# Patient Record
Sex: FEMALE | Race: BLACK OR AFRICAN AMERICAN | Employment: FULL TIME | URBAN - METROPOLITAN AREA
[De-identification: names, ages, dates, MRNs, and addresses within clinical notes are randomized per-mention and may not be internally consistent; named-entity substitution may affect disease eponyms.]

---

## 2017-01-04 ENCOUNTER — HOSPITAL ENCOUNTER (OUTPATIENT)
Dept: PHYSICAL THERAPY | Age: 47
Discharge: HOME | End: 2017-01-04
Attending: FAMILY MEDICINE
Payer: COMMERCIAL

## 2017-01-04 DIAGNOSIS — M79.7 FIBROMYALGIA: ICD-10-CM

## 2017-01-04 PROCEDURE — 97163 PT EVAL HIGH COMPLEX 45 MIN: CPT

## 2017-01-04 NOTE — PROGRESS NOTES
Ambulatory/Rehab Services H2 Model Falls Risk Assessment    Risk Factor Pts. ·   Confusion/Disorientation/Impulsivity  []    4 ·   Symptomatic Depression  []   2 ·   Altered Elimination  []   1 ·   Dizziness/Vertigo  []   1 ·   Gender (Male)  []   1 ·   Any administered antiepileptics (anticonvulsants):  []   2 ·   Any administered benzodiazepines:  []   1 ·   Visual Impairment (specify):  []   1 ·   Portable Oxygen Use  []   1 ·   Orthostatic ? BP  []   1 ·   History of Recent Falls (within 3 mos.)  []   5     Ability to Rise from Chair (choose one) Pts. ·   Ability to rise in a single movement  []   0 ·   Pushes up, successful in one attempt  []   1 ·   Multiple attempts, but successful  []   3 ·   Unable to rise without assistance  [x]   4   Total: (5 or greater = High Risk) 4     Falls Prevention Plan:   []                Physical Limitations to Exercise (specify):   []                Mobility Assistance Device (type):   []                Exercise/Equipment Adaptation (specify):    ©2010 [facility] [address] [place] [address] Patent #[id number].  Federal Law prohibits the replication, distribution or use without written permission from [facility] of [address] [place] [address]  1/4/2017

## 2017-01-04 NOTE — PROGRESS NOTES
Edwin Benito  : 1970 [facility] [place] [address] [address] [facility]  [phone]   [phone]        OUTPATIENT PHYSICAL THERAPY:Initial Assessment, Treatment Day: Day of Assessment and AM 2017    ICD-10: Treatment Diagnosis: fibromyalgia (M79.7)  Difficulty in walking, not elsewhere classified (R26.2)  Low back pain (M54.5)  Precautions/Allergies:   Percocet [oxycodone-acetaminophen] and Morphine   Fall Risk Score: 4 (? 5 = High Risk)  MD Orders: Evaluate and treat MEDICAL/REFERRING DIAGNOSIS:  Fibromyalgia [M79.7]   DATE OF ONSET: 18, diagnosed in , symptoms progressively getting worse  REFERRING PHYSICIAN: [address]: none scheduled at this time   Edwin Benito has attended 1 physical therapy session including initial evaluation. INITIAL ASSESSMENT: Assessed 17  Ms. Norm Jackson presents with increased pain and fatigue with diagnosis of fibromyalgia. She has a diagnosis of herniated disc lumbar and cervical spine. She presents with severe self reported fibromyalgia symptoms, severe self reported physical fatigue, moderate self reported overall fatigue (physical, cognitive, and psychosocial), and self reported mild mood disturbance for Plascencia Depression Index. She presents with postural and gait deviations listed in detail below. She presents with increased risk of falling according to Timed Up and Go assessment. She presents with decreased independence with functional mobility and requires assistance from her  for sit to stand transfers, tub transfers, bed mobility, and activities of daily living. She would benefit from skilled physical therapy in order to improve pain free independence with functional mobility, reduce fall risk and prevent future impairment. PROBLEM LIST (Impacting functional limitations):  1. Decreased Strength  2. Decreased ADL/Functional Activities  3.  Decreased Transfer Abilities  4. Decreased Ambulation Ability/Technique  5. Decreased Balance  6. Increased Pain  7. Decreased Activity Tolerance  8. Decreased Pacing Skills  9. Decreased Work Simplification/Energy Conservation Techniques  10. Increased Fatigue  11. Decreased Flexibility/Joint Mobility  12. Decreased [place] with Home Exercise Program INTERVENTIONS PLANNED:  1. Balance Exercise  2. Bed Mobility  3. Cold  4. Family Education  5. Gait Training  6. Heat  7. Home Exercise Program (HEP)  8. Manual Therapy  9. Neuromuscular Re-education/Strengthening  10. Range of Motion (ROM)  11. Therapeutic Activites  12. Therapeutic Exercise/Strengthening  13. Transfer Training  14. aquatic therapy   TREATMENT PLAN:  Effective Dates: 1/4/17 TO 4/4/17. Frequency/Duration: 2 times a week for 8 weeks  GOALS: (Goals have been discussed and agreed upon with patient.)  DISCHARGE GOALS: Time Frame: 8 weeks  Patient will be independent with home exercise program within 8 weeks in order to improve independence with management of patient's symptoms and/or functional deficits. Patient will improve their fibromyalgia impact scale score to less than 60 points within 8 weeks in order to improve activity tolerance and pain free independence with activities of daily living and functional mobility. Patient will improve their physical subscale score for modified fatigue impact scale to 28 points within 8 weeks in order to improve activity tolerance. Rehabilitation Potential For Stated Goals: Good  Regarding Zachary Babs Gan's therapy, I certify that the treatment plan above will be carried out by a therapist or under their direction. Thank you for this referral,  Ad Black PT     Referring Physician Signature: Mamie Aguillon *              Date                    HISTORY:   History of Present Injury/Illness (Reason for Referral):  Taranjose Geigerher notes she has been diagnosed with fibromyalgia since 1992.  She notes symptoms are progressively getting worse. She notes she is finding it harder to push through the symptoms. She requires assistance with functional transfers, bed mobility, and activities of daily living. She does work full time but notes frequent days she misses due to fibromyalgia symptoms. She notes she missed 7 days in December 2016. She reports pain in low back as nerve pain being pulled and tingling pain. She notes pain in posterior legs with straight leg raise and slump test this date. She declined to move low back during movement assessment due to fear of pain. 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 Job requirements include prolonged sitting, computer use, phone use  Tamiko Molina requires assistance from  to stand from any surface, bed mobility, get in/out of car, bathe (get in/out of tub), cook, clean, dress at times  Personal Factors:          Sex:  female        Age:  42 y.o. Current Medications: toprimate, trazadone, vitamin D2, diaxapam, flexeril, loritab, ambien   Date Last Reviewed:  1/4/2017   Number of Personal Factors/Comorbidities that affect the Plan of Care: 3+: HIGH COMPLEXITY   EXAMINATION:   Observation/Orthostatic Postural Assessment:  Assessed 1/4/17:  Tamiko Molina sits with forward head and rounded shoulders which indicate tight anterior chest musculature, upper trapezius, and levator scapula and weak posterior scapula musculature and deep cervical flexors.  She stands with left iliac crest superior to right, left lower extremity longer in supine (unable to assess in long sitting due to pain). She presents with convex curve to left in thoracic region, convex curve to right in lumbar spine. Right shoulder inferior to left,   Palpation:  Assessed 1/4/17        Santi Crowe is tender to palpate whole body throughout evaluation  ROM:  Assessed 1/4/17  Dorsiflexion to neutral with knee extended - limited by pain in posterior thighs and low back          Selective Functional Movement Assessment  Nonfunctional and painful  Cervical flexion, extension, rotation/side bend bilaterally 75% - pain mid back with all motions  appleys scratch test external rotation to T4 bilaterally - pain in forearms  appleys scratch test internal rotation to thoracolumbar region bilaterally - pain in shoulders  Multisegmental flexion and extension - decline to perform due to fear of increased pain  Multisegmental rotation bilaterally - 10% pain L5 region  Single leg stance less than 3 seconds bilaterally - performed independently  Strength:  Assessed 1/4/17        3+/5 all major muscle groups bilateral upper and lower extremities   Special Tests:  Assessed 1/4/17:        Slump test - positive bilaterally  Straight leg raise - positive bilaterally 20 degrees  Neurological Screen: Assessed 1/4/17         Myotomes:  Weak (limited by pain) but symmetrical bilateral lower and upper extremities         Dermatomes:  Tingling noted bilateral lower extremities, feet, hands        Reflexes:   Will assess as able  Functional Mobility: Assessed 1/4/17:        Gait/Ambulation:  Santi Crowe ambulates with modified independence, she ambulates with decreased step length bilaterally, decreased heel strike at initial contact, decreased hip extension/internal rotation during terminal stance        Transfers:  Santi Crowe required moderate assistance with sit to stand transfer this date, she placed hands on thighs to assist with standing upright        Bed Mobility:  Leila Bowman was able to perform bed mobility with modified independence -she used hands to assist lower extremities onto rehab mat - she notes she usually has moderate assistance with bed mobility when at home        Stairs:  Leila Bowman notes she requires moderate assistance with stair negotiation at home  Balance:  Assessed 1/4/17        Tool Used: Timed Up and Go (TUG)  Score:  Initial: 30 seconds    Interpretation of Score: The test measures, in seconds, the time taken by an individual to stand up from a standard arm chair (seat height 46 cm [18 in], arm height 65 cm [25.6 in]), walk a distance of 3 meters (118 in, approx 10 ft), turn, walk back to the chair and sit down. If the individual takes longer than 14 seconds to complete TUG, this indicates risk for falls. Score 7 7.5-10.5 11-14 14.5-17.5 18-21 21.5-24.5 25+   Modifier CH CI CJ CK CL CM CN     Mental Status:  Assessed 1/4/17        Alert and oriented x 4  Vitals: Will assess as able     Body Structures Involved:  1. Nerves  2. Bones  3. Joints  4. Muscles  5. Ligaments Body Functions Affected:  1. Sensory/Pain  2. Neuromusculoskeletal  3. Movement Related  4. Skin Related Activities and Participation Affected:  1. Learning and Applying Knowledge  2. General Tasks and Demands  3. Mobility  4. Self Care  5. Domestic Life  6. Interpersonal Interactions and Relationships  7.  Community, Social and [facility]   Number of elements that affect the Plan of Care: 4+: HIGH COMPLEXITY   CLINICAL PRESENTATION:   Presentation: Evolving clinical presentation with unstable and unpredictable characteristics: HIGH COMPLEXITY   CLINICAL DECISION MAKING:   Outcome Measure: Assessed 1/4/17    Tool Used: Fibromyalgia Impact Questionnaire  Score:  Initial: 72.73    Interpretation of Score: <40 = Mild Fibromyalgia Syndrome   40-60 = Moderate Fibromyalgia Syndrome   >60-70 = Severe Fibromyalgia Syndrome  Score 0 1-18 19-37 38-57 58-77 78-96 97 Modifier CH CI CJ CK CL CM CN     Tool Used: Plascencia Depression Index   Score:  Initial: 14    Interpretation of Score: 0-10 = ups and downs considered normal   11-16= mild mood disturbance   17-20= borderline clinical depression   21-30= moderate depression   31-40= severe depression   Over 40= extreme depression     Tool Used: Modified Fatigue Impact Scale  Score:  Initial: Total score: 42/84  Physical subscale score: 32  Cognitive subscale score: 3  Psychosocial subscale score: 7      Tool Used: 6-MINUTE WALK TEST  Score:  Initial: 400 feet - one standing rest    Interpretation of Score: Normal range varies but is approximately 9678-6000 Feet    Medical Necessity:   · Patient is expected to demonstrate progress in strength, range of motion, balance, coordination and functional technique to decrease assistance required with functional mobility and increase independence with pain free functional mobility. · Patient demonstrates good rehab potential due to higher previous functional level. Reason for Services/Other Comments:  · Patient continues to require skilled intervention due to increased pain and decreased independence with functional mobility. Use of outcome tool(s) and clinical judgement create a POC that gives a: Difficult prediction of patient's progress: HIGH COMPLEXITY   TREATMENT:   (In addition to Assessment/Re-Assessment sessions the following treatments were rendered)  Assessment only today, no treatment provided. Treatment/Session Assessment:  Tamiko Molina became tearful throughout therapy session due to increased pain and emotional stress from current symptoms. · Pre-treatment Symptoms:  Tamiko Molina notes 8/10 pain throughout body - mostly in back  · Pain: Initial:  Pain Intensity 1: 8  Pain Location 1: Back (whole body but mostly back)  Pain Orientation 1: Lower  Pain Intervention(s) 1: Position 8/10 Post Session:  8/10 ·   Compliance with Program/Exercises:  Will assess as treatment progresses. · Recommendations/Intent for next treatment session: \"Next visit will focus on advancements to more challenging activities and reduction in assistance provided\".   Total Treatment Duration:  PT Patient Time In/Time Out  Time In: 0925  Time Out: [address], [place]

## 2017-01-10 ENCOUNTER — HOSPITAL ENCOUNTER (OUTPATIENT)
Dept: PHYSICAL THERAPY | Age: 47
Discharge: HOME | End: 2017-01-10
Payer: COMMERCIAL

## 2017-01-10 PROCEDURE — 97113 AQUATIC THERAPY/EXERCISES: CPT

## 2017-01-11 NOTE — PROGRESS NOTES
Edwin Benito  : 1970 [facility] [place] [address] [address] [facility]  [phone]   [phone]        OUTPATIENT PHYSICAL THERAPY:Daily Note and Aquatic Note 1/10/2017    ICD-10: Treatment Diagnosis: fibromyalgia (M79.7)  Difficulty in walking, not elsewhere classified (R26.2)  Low back pain (M54.5)  Precautions/Allergies:   Percocet [oxycodone-acetaminophen] and Morphine   Fall Risk Score: 4 (? 5 = High Risk)  MD Orders: Evaluate and treat MEDICAL/REFERRING DIAGNOSIS:  Fibromyalgia   DATE OF ONSET: 18, diagnosed in , symptoms progressively getting worse  REFERRING PHYSICIAN: [address]: none scheduled at this time   Edwin Benito has attended 1 physical therapy session including initial evaluation. INITIAL ASSESSMENT: Assessed 17  Ms. Norm Jackson presents with increased pain and fatigue with diagnosis of fibromyalgia. She has a diagnosis of herniated disc lumbar and cervical spine. She presents with severe self reported fibromyalgia symptoms, severe self reported physical fatigue, moderate self reported overall fatigue (physical, cognitive, and psychosocial), and self reported mild mood disturbance for Plascencia Depression Index. She presents with postural and gait deviations listed in detail below. She presents with increased risk of falling according to Timed Up and Go assessment. She presents with decreased independence with functional mobility and requires assistance from her  for sit to stand transfers, tub transfers, bed mobility, and activities of daily living. She would benefit from skilled physical therapy in order to improve pain free independence with functional mobility, reduce fall risk and prevent future impairment. PROBLEM LIST (Impacting functional limitations):  1. Decreased Strength  2. Decreased ADL/Functional Activities  3. Decreased Transfer Abilities  4.  Decreased Ambulation Ability/Technique  5. Decreased Balance  6. Increased Pain  7. Decreased Activity Tolerance  8. Decreased Pacing Skills  9. Decreased Work Simplification/Energy Conservation Techniques  10. Increased Fatigue  11. Decreased Flexibility/Joint Mobility  12. Decreased Wake with Home Exercise Program INTERVENTIONS PLANNED:  1. Balance Exercise  2. Bed Mobility  3. Cold  4. Family Education  5. Gait Training  6. Heat  7. Home Exercise Program (HEP)  8. Manual Therapy  9. Neuromuscular Re-education/Strengthening  10. Range of Motion (ROM)  11. Therapeutic Activites  12. Therapeutic Exercise/Strengthening  13. Transfer Training  14. aquatic therapy   TREATMENT PLAN:  Effective Dates: 1/4/17 TO 4/4/17. Frequency/Duration: 2 times a week for 8 weeks  GOALS: (Goals have been discussed and agreed upon with patient.)  DISCHARGE GOALS: Time Frame: 8 weeks  Patient will be independent with home exercise program within 8 weeks in order to improve independence with management of patient's symptoms and/or functional deficits. Patient will improve their fibromyalgia impact scale score to less than 60 points within 8 weeks in order to improve activity tolerance and pain free independence with activities of daily living and functional mobility. Patient will improve their physical subscale score for modified fatigue impact scale to 28 points within 8 weeks in order to improve activity tolerance. Rehabilitation Potential For Stated Goals: Good  Regarding Amaya Gna's therapy, I certify that the treatment plan above will be carried out by a therapist or under their direction. Thank you for this referral,  Gabby Robles PTA     Referring Physician Signature: Angelica Diego *              Date                    HISTORY:   History of Present Injury/Illness (Reason for Referral):  Calvert Karbj notes she has been diagnosed with fibromyalgia since 1992.  She notes symptoms are progressively getting worse. She notes she is finding it harder to push through the symptoms. She requires assistance with functional transfers, bed mobility, and activities of daily living. She does work full time but notes frequent days she misses due to fibromyalgia symptoms. She notes she missed 7 days in December 2016. She reports pain in low back as nerve pain being pulled and tingling pain. She notes pain in posterior legs with straight leg raise and slump test this date. She declined to move low back during movement assessment due to fear of pain. 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 Job requirements include prolonged sitting, computer use, phone use  Radha Wells requires assistance from  to stand from any surface, bed mobility, get in/out of car, bathe (get in/out of tub), cook, clean, dress at times  Personal Factors:          Sex:  female        Age:  42 y.o. Current Medications: toprimate, trazadone, vitamin D2, diaxapam, flexeril, loritab, ambien   Date Last Reviewed:  1/10/2017   Number of Personal Factors/Comorbidities that affect the Plan of Care: 3+: HIGH COMPLEXITY   EXAMINATION:   Observation/Orthostatic Postural Assessment:  Assessed 1/4/17:  Radha Wells sits with forward head and rounded shoulders which indicate tight anterior chest musculature, upper trapezius, and levator scapula and weak posterior scapula musculature and deep cervical flexors.  She stands with left iliac crest superior to right, left lower extremity longer in supine (unable to assess in long sitting due to pain). She presents with convex curve to left in thoracic region, convex curve to right in lumbar spine. Right shoulder inferior to left,   Palpation:  Assessed 1/4/17        Katie He is tender to palpate whole body throughout evaluation  ROM:  Assessed 1/4/17  Dorsiflexion to neutral with knee extended - limited by pain in posterior thighs and low back          Selective Functional Movement Assessment  Nonfunctional and painful  Cervical flexion, extension, rotation/side bend bilaterally 75% - pain mid back with all motions  appleys scratch test external rotation to T4 bilaterally - pain in forearms  appleys scratch test internal rotation to thoracolumbar region bilaterally - pain in shoulders  Multisegmental flexion and extension - decline to perform due to fear of increased pain  Multisegmental rotation bilaterally - 10% pain L5 region  Single leg stance less than 3 seconds bilaterally - performed independently  Strength:  Assessed 1/4/17        3+/5 all major muscle groups bilateral upper and lower extremities   Special Tests:  Assessed 1/4/17:        Slump test - positive bilaterally  Straight leg raise - positive bilaterally 20 degrees  Neurological Screen: Assessed 1/4/17         Myotomes:  Weak (limited by pain) but symmetrical bilateral lower and upper extremities         Dermatomes:  Tingling noted bilateral lower extremities, feet, hands        Reflexes:   Will assess as able  Functional Mobility: Assessed 1/4/17:        Gait/Ambulation:  Katie He ambulates with modified independence, she ambulates with decreased step length bilaterally, decreased heel strike at initial contact, decreased hip extension/internal rotation during terminal stance        Transfers:  Katie He required moderate assistance with sit to stand transfer this date, she placed hands on thighs to assist with standing upright        Bed Mobility:  Katie He was able to perform bed mobility with modified independence -she used hands to assist lower extremities onto rehab mat - she notes she usually has moderate assistance with bed mobility when at home        Stairs:  Edwin Benito notes she requires moderate assistance with stair negotiation at home  Balance:  Assessed 1/4/17        Tool Used: Timed Up and Go (TUG)  Score:  Initial: 30 seconds    Interpretation of Score: The test measures, in seconds, the time taken by an individual to stand up from a standard arm chair (seat height 46 cm [18 in], arm height 65 cm [25.6 in]), walk a distance of 3 meters (118 in, approx 10 ft), turn, walk back to the chair and sit down. If the individual takes longer than 14 seconds to complete TUG, this indicates risk for falls. Score 7 7.5-10.5 11-14 14.5-17.5 18-21 21.5-24.5 25+   Modifier CH CI CJ CK CL CM CN     Mental Status:  Assessed 1/4/17        Alert and oriented x 4  Vitals: Will assess as able     Body Structures Involved:  1. Nerves  2. Bones  3. Joints  4. Muscles  5. Ligaments Body Functions Affected:  1. Sensory/Pain  2. Neuromusculoskeletal  3. Movement Related  4. Skin Related Activities and Participation Affected:  1. Learning and Applying Knowledge  2. General Tasks and Demands  3. Mobility  4. Self Care  5. Domestic Life  6. Interpersonal Interactions and Relationships  7.  Community, Social and [place]   Number of elements that affect the Plan of Care: 4+: HIGH COMPLEXITY   CLINICAL PRESENTATION:   Presentation: Evolving clinical presentation with unstable and unpredictable characteristics: HIGH COMPLEXITY   CLINICAL DECISION MAKING:   Outcome Measure: Assessed 1/4/17    Tool Used: Fibromyalgia Impact Questionnaire  Score:  Initial: 72.73    Interpretation of Score: <40 = Mild Fibromyalgia Syndrome   40-60 = Moderate Fibromyalgia Syndrome   >60-70 = Severe Fibromyalgia Syndrome  Score 0 1-18 19-37 38-57 58-77 78-96 97   Modifier CH CI CJ CK CL CM CN Tool Used: Plascencia Depression Index   Score:  Initial: 14    Interpretation of Score: 0-10 = ups and downs considered normal   11-16= mild mood disturbance   17-20= borderline clinical depression   21-30= moderate depression   31-40= severe depression   Over 40= extreme depression     Tool Used: Modified Fatigue Impact Scale  Score:  Initial: Total score: 42/84  Physical subscale score: 32  Cognitive subscale score: 3  Psychosocial subscale score: 7      Tool Used: 6-MINUTE WALK TEST  Score:  Initial: 400 feet - one standing rest    Interpretation of Score: Normal range varies but is approximately 6867-1422 Feet    Medical Necessity:   · Patient is expected to demonstrate progress in strength, range of motion, balance, coordination and functional technique to decrease assistance required with functional mobility and increase independence with pain free functional mobility. · Patient demonstrates good rehab potential due to higher previous functional level. Reason for Services/Other Comments:  · Patient continues to require skilled intervention due to increased pain and decreased independence with functional mobility. Use of outcome tool(s) and clinical judgement create a POC that gives a: Difficult prediction of patient's progress: HIGH COMPLEXITY   TREATMENT:   (In addition to Assessment/Re-Assessment sessions the following treatments were rendered)    Aquatic Therapy (45 minutes): Aquatic treatment performed per flow grid for Decreased muscle strength, Decreased endurance and Decreased static/dynamic balance and reactive control.        Aquatic Exercise Log       Date  1-10-17 Date   Date   Date   Date     Activity/ Exercise Parameters Parameters Parameters Parameters Parameters   Walking forward 2 laps       Walking backward 2 laps       Walking sideways 2 laps         Marching 2 laps         Goose Step 2 laps         Tip toes 2 laps         Heels          Lunges        Side step squats        LE Exercises noodle         Hip Flex/Ext Marching x 10 B         Hip Abd/Add X 10 B         Hip IR/ER          Calf raises          Knee Flex          Squats Knees to chest x 10          Leg Circles          Step Ups        UE Exercises noodle         Squeeze In X 10 B         Push Down X 10 B         Pull Down          Bicep/Tricep        Rows/Press outs         Chi Positions          Trunk Rotation        Deep H2O/ Noodles noodle         Stabilization          Arms only          Legs only Under arms- bicycle       Cross   Country 2 x 10          Scissors 2 x 10          Crab walk 2 laps       Lower abdominal   work  Knees to chest x 10          Cardio          Jogging        Lap   Swimming          Stretches []  In [place]  [x]  Whirlpool* []  In [place]  []  Whirlpool* []  In [place]  []  Whirlpool* []  In Pool  []  Whirlpool* []  In Pool  []  Whirlpool*     Hamstrings X 3 B         Heelcords X 3 B         Piriformis X 3 B         Neck                  * For increased soft tissue extensibility a warm water (over 100°F) environment was utilized. Supervision/monitoring was provided at all times. Treatment/Session Assessment:  Ruth Ann Lozano reports that her pain was less than when we started today. · Pre-treatment Symptoms:  Ruth Ann Lozano notes 7/10 pain throughout body - mostly in knees and back. · Pain: Initial:    10/10 Post Session:  7/10 ·   Compliance with Program/Exercises: Will assess as treatment progresses. · Recommendations/Intent for next treatment session: \"Next visit will focus on advancements to more challenging activities and reduction in assistance provided\".   Total Treatment Duration: 45 minutes   PT Patient Time In/Time Out  Time In: 1400 (Patient was 15 minutes late)  Time Out: [address] [facility], [facility]

## 2017-01-13 ENCOUNTER — HOSPITAL ENCOUNTER (OUTPATIENT)
Dept: PHYSICAL THERAPY | Age: 47
Discharge: HOME | End: 2017-01-13
Attending: FAMILY MEDICINE
Payer: COMMERCIAL

## 2017-01-13 NOTE — PROGRESS NOTES
Luz Garcia  : 1970 [facility] [address]  [phone]   [phone]        OUTPATIENT DAILY NOTE    NAME/AGE/GENDER: Luz Garcia is a 42 y.o. female. DATE: 2017    Patient canceled physical therapy appointment today due to recent injection. Will plan to follow up on next scheduled visit.     Senait Gaitan, PT

## 2017-01-17 ENCOUNTER — HOSPITAL ENCOUNTER (OUTPATIENT)
Dept: PHYSICAL THERAPY | Age: 47
Discharge: HOME | End: 2017-01-17
Payer: COMMERCIAL

## 2017-01-17 PROCEDURE — 97113 AQUATIC THERAPY/EXERCISES: CPT

## 2017-01-18 NOTE — PROGRESS NOTES
Wellington Mail  : 1970 [facility] at [address], [facility]  [phone]   [phone]        OUTPATIENT PHYSICAL THERAPY:Daily Note and Aquatic Note 2017    ICD-10: Treatment Diagnosis: fibromyalgia (M79.7)  Difficulty in walking, not elsewhere classified (R26.2)  Low back pain (M54.5)  Precautions/Allergies:   Percocet [oxycodone-acetaminophen] and Morphine   Fall Risk Score: 4 (? 5 = High Risk)  MD Orders: Evaluate and treat MEDICAL/REFERRING DIAGNOSIS:  Fibromyalgia   DATE OF ONSET: 18, diagnosed in , symptoms progressively getting worse  REFERRING PHYSICIAN: [address]: none scheduled at this time   Wellington Jackson has attended 1 physical therapy session including initial evaluation. INITIAL ASSESSMENT: Assessed 17  Ms. Adelita Angelucci presents with increased pain and fatigue with diagnosis of fibromyalgia. She has a diagnosis of herniated disc lumbar and cervical spine. She presents with severe self reported fibromyalgia symptoms, severe self reported physical fatigue, moderate self reported overall fatigue (physical, cognitive, and psychosocial), and self reported mild mood disturbance for Plascencia Depression Index. She presents with postural and gait deviations listed in detail below. She presents with increased risk of falling according to Timed Up and Go assessment. She presents with decreased independence with functional mobility and requires assistance from her  for sit to stand transfers, tub transfers, bed mobility, and activities of daily living. She would benefit from skilled physical therapy in order to improve pain free independence with functional mobility, reduce fall risk and prevent future impairment. PROBLEM LIST (Impacting functional limitations):  1. Decreased Strength  2. Decreased ADL/Functional Activities  3. Decreased Transfer Abilities  4.  Decreased Ambulation Ability/Technique  5. Decreased Balance  6. Increased Pain  7. Decreased Activity Tolerance  8. Decreased Pacing Skills  9. Decreased Work Simplification/Energy Conservation Techniques  10. Increased Fatigue  11. Decreased Flexibility/Joint Mobility  12. Decreased [place] with Home Exercise Program INTERVENTIONS PLANNED:  1. Balance Exercise  2. Bed Mobility  3. Cold  4. Family Education  5. Gait Training  6. Heat  7. Home Exercise Program (HEP)  8. Manual Therapy  9. Neuromuscular Re-education/Strengthening  10. Range of Motion (ROM)  11. Therapeutic Activites  12. Therapeutic Exercise/Strengthening  13. Transfer Training  14. aquatic therapy   TREATMENT PLAN:  Effective Dates: 1/4/17 TO 4/4/17. Frequency/Duration: 2 times a week for 8 weeks  GOALS: (Goals have been discussed and agreed upon with patient.)  DISCHARGE GOALS: Time Frame: 8 weeks  Patient will be independent with home exercise program within 8 weeks in order to improve independence with management of patient's symptoms and/or functional deficits. Patient will improve their fibromyalgia impact scale score to less than 60 points within 8 weeks in order to improve activity tolerance and pain free independence with activities of daily living and functional mobility. Patient will improve their physical subscale score for modified fatigue impact scale to 28 points within 8 weeks in order to improve activity tolerance. Rehabilitation Potential For Stated Goals: Good  Regarding Damián Shinn's therapy, I certify that the treatment plan above will be carried out by a therapist or under their direction. Thank you for this referral,  Adeline Dinh PTA     Referring Physician Signature: Soniya Elena *              Date                    HISTORY:   History of Present Injury/Illness (Reason for Referral):  Gena Flower notes she has been diagnosed with fibromyalgia since 1992.  She notes symptoms are progressively getting worse. She notes she is finding it harder to push through the symptoms. She requires assistance with functional transfers, bed mobility, and activities of daily living. She does work full time but notes frequent days she misses due to fibromyalgia symptoms. She notes she missed 7 days in December 2016. She reports pain in low back as nerve pain being pulled and tingling pain. She notes pain in posterior legs with straight leg raise and slump test this date. She declined to move low back during movement assessment due to fear of pain. 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 Job requirements include prolonged sitting, computer use, phone use  Felicity Mckeon requires assistance from  to stand from any surface, bed mobility, get in/out of car, bathe (get in/out of tub), cook, clean, dress at times  Personal Factors:          Sex:  female        Age:  42 y.o. Current Medications: toprimate, trazadone, vitamin D2, diaxapam, flexeril, loritab, ambien   Date Last Reviewed:  1/17/2017   Number of Personal Factors/Comorbidities that affect the Plan of Care: 3+: HIGH COMPLEXITY   EXAMINATION:   Observation/Orthostatic Postural Assessment:  Assessed 1/4/17:  Felicity Mckeon sits with forward head and rounded shoulders which indicate tight anterior chest musculature, upper trapezius, and levator scapula and weak posterior scapula musculature and deep cervical flexors.  She stands with left iliac crest superior to right, left lower extremity longer in supine (unable to assess in long sitting due to pain). She presents with convex curve to left in thoracic region, convex curve to right in lumbar spine. Right shoulder inferior to left,   Palpation:  Assessed 1/4/17        Anton Simpson is tender to palpate whole body throughout evaluation  ROM:  Assessed 1/4/17  Dorsiflexion to neutral with knee extended - limited by pain in posterior thighs and low back          Selective Functional Movement Assessment  Nonfunctional and painful  Cervical flexion, extension, rotation/side bend bilaterally 75% - pain mid back with all motions  appleys scratch test external rotation to T4 bilaterally - pain in forearms  appleys scratch test internal rotation to thoracolumbar region bilaterally - pain in shoulders  Multisegmental flexion and extension - decline to perform due to fear of increased pain  Multisegmental rotation bilaterally - 10% pain L5 region  Single leg stance less than 3 seconds bilaterally - performed independently  Strength:  Assessed 1/4/17        3+/5 all major muscle groups bilateral upper and lower extremities   Special Tests:  Assessed 1/4/17:        Slump test - positive bilaterally  Straight leg raise - positive bilaterally 20 degrees  Neurological Screen: Assessed 1/4/17         Myotomes:  Weak (limited by pain) but symmetrical bilateral lower and upper extremities         Dermatomes:  Tingling noted bilateral lower extremities, feet, hands        Reflexes:   Will assess as able  Functional Mobility: Assessed 1/4/17:        Gait/Ambulation:  Anton Simpson ambulates with modified independence, she ambulates with decreased step length bilaterally, decreased heel strike at initial contact, decreased hip extension/internal rotation during terminal stance        Transfers:  Anton Simpson required moderate assistance with sit to stand transfer this date, she placed hands on thighs to assist with standing upright        Bed Mobility:  Anton Simpson was able to perform bed mobility with modified independence -she used hands to assist lower extremities onto rehab mat - she notes she usually has moderate assistance with bed mobility when at home        Stairs:  Ethan Dubose notes she requires moderate assistance with stair negotiation at home  Balance:  Assessed 1/4/17        Tool Used: Timed Up and Go (TUG)  Score:  Initial: 30 seconds    Interpretation of Score: The test measures, in seconds, the time taken by an individual to stand up from a standard arm chair (seat height 46 cm [18 in], arm height 65 cm [25.6 in]), walk a distance of 3 meters (118 in, approx 10 ft), turn, walk back to the chair and sit down. If the individual takes longer than 14 seconds to complete TUG, this indicates risk for falls. Score 7 7.5-10.5 11-14 14.5-17.5 18-21 21.5-24.5 25+   Modifier CH CI CJ CK CL CM CN     Mental Status:  Assessed 1/4/17        Alert and oriented x 4  Vitals: Will assess as able     Body Structures Involved:  1. Nerves  2. Bones  3. Joints  4. Muscles  5. Ligaments Body Functions Affected:  1. Sensory/Pain  2. Neuromusculoskeletal  3. Movement Related  4. Skin Related Activities and Participation Affected:  1. Learning and Applying Knowledge  2. General Tasks and Demands  3. Mobility  4. Self Care  5. Domestic Life  6. Interpersonal Interactions and Relationships  7.  Community, Social and [place]   Number of elements that affect the Plan of Care: 4+: HIGH COMPLEXITY   CLINICAL PRESENTATION:   Presentation: Evolving clinical presentation with unstable and unpredictable characteristics: HIGH COMPLEXITY   CLINICAL DECISION MAKING:   Outcome Measure: Assessed 1/4/17    Tool Used: Fibromyalgia Impact Questionnaire  Score:  Initial: 72.73    Interpretation of Score: <40 = Mild Fibromyalgia Syndrome   40-60 = Moderate Fibromyalgia Syndrome   >60-70 = Severe Fibromyalgia Syndrome  Score 0 1-18 19-37 38-57 58-77 78-96 97   Modifier CH CI CJ CK CL CM CN Tool Used: Plascencia Depression Index   Score:  Initial: 14    Interpretation of Score: 0-10 = ups and downs considered normal   11-16= mild mood disturbance   17-20= borderline clinical depression   21-30= moderate depression   31-40= severe depression   Over 40= extreme depression     Tool Used: Modified Fatigue Impact Scale  Score:  Initial: Total score: 42/84  Physical subscale score: 32  Cognitive subscale score: 3  Psychosocial subscale score: 7      Tool Used: 6-MINUTE WALK TEST  Score:  Initial: 400 feet - one standing rest    Interpretation of Score: Normal range varies but is approximately 8366-4638 Feet    Medical Necessity:   · Patient is expected to demonstrate progress in strength, range of motion, balance, coordination and functional technique to decrease assistance required with functional mobility and increase independence with pain free functional mobility. · Patient demonstrates good rehab potential due to higher previous functional level. Reason for Services/Other Comments:  · Patient continues to require skilled intervention due to increased pain and decreased independence with functional mobility. Use of outcome tool(s) and clinical judgement create a POC that gives a: Difficult prediction of patient's progress: HIGH COMPLEXITY   TREATMENT:   (In addition to Assessment/Re-Assessment sessions the following treatments were rendered)    Aquatic Therapy (43 minutes): Aquatic treatment performed per flow grid for Decreased muscle strength, Decreased endurance and Decreased static/dynamic balance and reactive control.        Aquatic Exercise Log       Date  1-10-17 Date  1-17-17 Date   Date   Date     Activity/ Exercise Parameters Parameters Parameters Parameters Parameters   Walking forward 2 laps 2 laps      Walking backward 2 laps 2 laps      Walking sideways 2 laps 2 laps        Marching 2 laps 2 laps        Goose Step 2 laps 2 laps        Tip toes 2 laps 2 laps        Heels          Lunges Side step squats        LE Exercises noodle noodle        Hip Flex/Ext Marching x 10 B Marching x 12 B        Hip Abd/Add X 10 B X 12 B        Hip IR/ER          Calf raises          Knee Flex          Squats Knees to chest x 10          Leg Circles  In 6 feet  X 10 each wau each leg        Step Ups        UE Exercises noodle noodle        Squeeze In X 10 B X 12 B        Push Down X 10 B X 12 B        Pull Down          Bicep/Tricep        Rows/Press outs         Chi Positions          Trunk Rotation        Deep H2O/ Noodles noodle noodle        Stabilization          Arms only          Legs only Under arms- bicycle Both arms and legs  4 laps      [address] 2 x 10  X 20         Scissors 2 x 10  X 20         Crab walk 2 laps       Lower abdominal   work  Knees to chest x 10  2 sequences x 10 each way        Cardio          Jogging        Lap   Swimming          Stretches []  In [employer]  [x]  Whirlpool* []  In Pool  [x]  Whirlpool* []  In Pool  []  Whirlpool* []  In Pool  []  Whirlpool* []  In Pool  []  Whirlpool*     Hamstrings X 3 B X 3 B        Heelcords X 3 B X 3 B        Piriformis X 3 B X 3 B        Neck                  * For increased soft tissue extensibility a warm water (over 100°F) environment was utilized. Supervision/monitoring was provided at all times. Treatment/Session Assessment:  Gena Flower reports that her pain was less than when we started today. · Pre-treatment Symptoms:  Gena Flower notes 7/10 pain throughout body - mostly in knees and back. · Pain: Initial:    8/10 Post Session:  6/10 ·   Compliance with Program/Exercises: Will assess as treatment progresses. · Recommendations/Intent for next treatment session: \"Next visit will focus on advancements to more challenging activities and reduction in assistance provided\".   Total Treatment Duration: 43 minutes   PT Patient Time In/Time Out  Time In: 1287  Time Out: [place], [place]

## 2017-01-20 ENCOUNTER — HOSPITAL ENCOUNTER (OUTPATIENT)
Dept: PHYSICAL THERAPY | Age: 47
Discharge: HOME | End: 2017-01-20
Attending: FAMILY MEDICINE
Payer: COMMERCIAL

## 2017-01-20 ENCOUNTER — HOSPITAL ENCOUNTER (OUTPATIENT)
Dept: ULTRASOUND IMAGING | Age: 47
Discharge: HOME | End: 2017-01-20
Attending: FAMILY MEDICINE
Payer: COMMERCIAL

## 2017-01-20 DIAGNOSIS — E04.9 GOITER: ICD-10-CM

## 2017-01-20 PROCEDURE — 76536 US EXAM OF HEAD AND NECK: CPT

## 2017-01-23 PROBLEM — E04.2 MULTIPLE THYROID NODULES: Status: ACTIVE | Noted: 2017-01-23

## 2017-01-23 NOTE — PROGRESS NOTES
Please tell Mrs. Adelita Angelucci that her ultrasound did show nodules in her thyroid. I will send her to the the endocrinologist for further evaluation.     Trevor Falcon MD

## 2017-01-26 ENCOUNTER — HOSPITAL ENCOUNTER (OUTPATIENT)
Dept: PHYSICAL THERAPY | Age: 47
Discharge: HOME | End: 2017-01-26
Payer: COMMERCIAL

## 2017-01-26 PROCEDURE — 97113 AQUATIC THERAPY/EXERCISES: CPT

## 2017-01-26 NOTE — PROGRESS NOTES
Arnold Ballesteros  : 1970 [facility] at [address], [facility]  [phone]   [phone]        OUTPATIENT PHYSICAL THERAPY:Daily Note and Aquatic Note 2017    ICD-10: Treatment Diagnosis: fibromyalgia (M79.7)  Difficulty in walking, not elsewhere classified (R26.2)  Low back pain (M54.5)  Precautions/Allergies:   Percocet [oxycodone-acetaminophen] and Morphine   Fall Risk Score: 4 (? 5 = High Risk)  MD Orders: Evaluate and treat MEDICAL/REFERRING DIAGNOSIS:  Fibromyalgia   DATE OF ONSET: 18, diagnosed in , symptoms progressively getting worse  REFERRING PHYSICIAN: [address]: none scheduled at this time   Arnold Ballesteros has attended 1 physical therapy session including initial evaluation. INITIAL ASSESSMENT: Assessed 17  Ms. Rasta Joseph presents with increased pain and fatigue with diagnosis of fibromyalgia. She has a diagnosis of herniated disc lumbar and cervical spine. She presents with severe self reported fibromyalgia symptoms, severe self reported physical fatigue, moderate self reported overall fatigue (physical, cognitive, and psychosocial), and self reported mild mood disturbance for Plascencia Depression Index. She presents with postural and gait deviations listed in detail below. She presents with increased risk of falling according to Timed Up and Go assessment. She presents with decreased independence with functional mobility and requires assistance from her  for sit to stand transfers, tub transfers, bed mobility, and activities of daily living. She would benefit from skilled physical therapy in order to improve pain free independence with functional mobility, reduce fall risk and prevent future impairment. PROBLEM LIST (Impacting functional limitations):  1. Decreased Strength  2. Decreased ADL/Functional Activities  3. Decreased Transfer Abilities  4.  Decreased Ambulation Ability/Technique  5. Decreased Balance  6. Increased Pain  7. Decreased Activity Tolerance  8. Decreased Pacing Skills  9. Decreased Work Simplification/Energy Conservation Techniques  10. Increased Fatigue  11. Decreased Flexibility/Joint Mobility  12. Decreased [place] with Home Exercise Program INTERVENTIONS PLANNED:  1. Balance Exercise  2. Bed Mobility  3. Cold  4. Family Education  5. Gait Training  6. Heat  7. Home Exercise Program (HEP)  8. Manual Therapy  9. Neuromuscular Re-education/Strengthening  10. Range of Motion (ROM)  11. Therapeutic Activites  12. Therapeutic Exercise/Strengthening  13. Transfer Training  14. aquatic therapy   TREATMENT PLAN:  Effective Dates: 1/4/17 TO 4/4/17. Frequency/Duration: 2 times a week for 8 weeks  GOALS: (Goals have been discussed and agreed upon with patient.)  DISCHARGE GOALS: Time Frame: 8 weeks  Patient will be independent with home exercise program within 8 weeks in order to improve independence with management of patient's symptoms and/or functional deficits. Patient will improve their fibromyalgia impact scale score to less than 60 points within 8 weeks in order to improve activity tolerance and pain free independence with activities of daily living and functional mobility. Patient will improve their physical subscale score for modified fatigue impact scale to 28 points within 8 weeks in order to improve activity tolerance. Rehabilitation Potential For Stated Goals: Good  Regarding Marilu Gan's therapy, I certify that the treatment plan above will be carried out by a therapist or under their direction. Thank you for this referral,  Yaritza Valdes, PTA     Referring Physician Signature: Esthela Randhawa *              Date                    HISTORY:   History of Present Injury/Illness (Reason for Referral):  Nathaly Dang notes she has been diagnosed with fibromyalgia since 1992.  She notes symptoms are progressively getting worse. She notes she is finding it harder to push through the symptoms. She requires assistance with functional transfers, bed mobility, and activities of daily living. She does work full time but notes frequent days she misses due to fibromyalgia symptoms. She notes she missed 7 days in December 2016. She reports pain in low back as nerve pain being pulled and tingling pain. She notes pain in posterior legs with straight leg raise and slump test this date. She declined to move low back during movement assessment due to fear of pain. 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 Job requirements include prolonged sitting, computer use, phone use  Mario Edwards requires assistance from  to stand from any surface, bed mobility, get in/out of car, bathe (get in/out of tub), cook, clean, dress at times  Personal Factors:          Sex:  female        Age:  42 y.o. Current Medications: toprimate, trazadone, vitamin D2, diaxapam, flexeril, loritab, ambien   Date Last Reviewed:  1/26/2017   Number of Personal Factors/Comorbidities that affect the Plan of Care: 3+: HIGH COMPLEXITY   EXAMINATION:   Observation/Orthostatic Postural Assessment:  Assessed 1/4/17:  Mario Edwards sits with forward head and rounded shoulders which indicate tight anterior chest musculature, upper trapezius, and levator scapula and weak posterior scapula musculature and deep cervical flexors.  She stands with left iliac crest superior to right, left lower extremity longer in supine (unable to assess in long sitting due to pain). She presents with convex curve to left in thoracic region, convex curve to right in lumbar spine. Right shoulder inferior to left,   Palpation:  Assessed 1/4/17        Francisca Askew is tender to palpate whole body throughout evaluation  ROM:  Assessed 1/4/17  Dorsiflexion to neutral with knee extended - limited by pain in posterior thighs and low back          Selective Functional Movement Assessment  Nonfunctional and painful  Cervical flexion, extension, rotation/side bend bilaterally 75% - pain mid back with all motions  appleys scratch test external rotation to T4 bilaterally - pain in forearms  appleys scratch test internal rotation to thoracolumbar region bilaterally - pain in shoulders  Multisegmental flexion and extension - decline to perform due to fear of increased pain  Multisegmental rotation bilaterally - 10% pain L5 region  Single leg stance less than 3 seconds bilaterally - performed independently  Strength:  Assessed 1/4/17        3+/5 all major muscle groups bilateral upper and lower extremities   Special Tests:  Assessed 1/4/17:        Slump test - positive bilaterally  Straight leg raise - positive bilaterally 20 degrees  Neurological Screen: Assessed 1/4/17         Myotomes:  Weak (limited by pain) but symmetrical bilateral lower and upper extremities         Dermatomes:  Tingling noted bilateral lower extremities, feet, hands        Reflexes:   Will assess as able  Functional Mobility: Assessed 1/4/17:        Gait/Ambulation:  Francisca Askew ambulates with modified independence, she ambulates with decreased step length bilaterally, decreased heel strike at initial contact, decreased hip extension/internal rotation during terminal stance        Transfers:  Francisca Askew required moderate assistance with sit to stand transfer this date, she placed hands on thighs to assist with standing upright        Bed Mobility:  Francisca Askew was able to perform bed mobility with modified independence -she used hands to assist lower extremities onto rehab mat - she notes she usually has moderate assistance with bed mobility when at home        Stairs:  Coy Nolasco notes she requires moderate assistance with stair negotiation at home  Balance:  Assessed 1/4/17        Tool Used: Timed Up and Go (TUG)  Score:  Initial: 30 seconds    Interpretation of Score: The test measures, in seconds, the time taken by an individual to stand up from a standard arm chair (seat height 46 cm [18 in], arm height 65 cm [25.6 in]), walk a distance of 3 meters (118 in, approx 10 ft), turn, walk back to the chair and sit down. If the individual takes longer than 14 seconds to complete TUG, this indicates risk for falls. Score 7 7.5-10.5 11-14 14.5-17.5 18-21 21.5-24.5 25+   Modifier CH CI CJ CK CL CM CN     Mental Status:  Assessed 1/4/17        Alert and oriented x 4  Vitals: Will assess as able     Body Structures Involved:  1. Nerves  2. Bones  3. Joints  4. Muscles  5. Ligaments Body Functions Affected:  1. Sensory/Pain  2. Neuromusculoskeletal  3. Movement Related  4. Skin Related Activities and Participation Affected:  1. Learning and Applying Knowledge  2. General Tasks and Demands  3. Mobility  4. Self Care  5. Domestic Life  6. Interpersonal Interactions and Relationships  7.  Community, Social and [place]   Number of elements that affect the Plan of Care: 4+: HIGH COMPLEXITY   CLINICAL PRESENTATION:   Presentation: Evolving clinical presentation with unstable and unpredictable characteristics: HIGH COMPLEXITY   CLINICAL DECISION MAKING:   Outcome Measure: Assessed 1/4/17    Tool Used: Fibromyalgia Impact Questionnaire  Score:  Initial: 72.73    Interpretation of Score: <40 = Mild Fibromyalgia Syndrome   40-60 = Moderate Fibromyalgia Syndrome   >60-70 = Severe Fibromyalgia Syndrome  Score 0 1-18 19-37 38-57 58-77 78-96 97   Modifier CH CI CJ CK CL CM CN Tool Used: Plascencia Depression Index   Score:  Initial: 14    Interpretation of Score: 0-10 = ups and downs considered normal   11-16= mild mood disturbance   17-20= borderline clinical depression   21-30= moderate depression   31-40= severe depression   Over 40= extreme depression     Tool Used: Modified Fatigue Impact Scale  Score:  Initial: Total score: 42/84  Physical subscale score: 32  Cognitive subscale score: 3  Psychosocial subscale score: 7      Tool Used: 6-MINUTE WALK TEST  Score:  Initial: 400 feet - one standing rest    Interpretation of Score: Normal range varies but is approximately 7371-9159 Feet    Medical Necessity:   · Patient is expected to demonstrate progress in strength, range of motion, balance, coordination and functional technique to decrease assistance required with functional mobility and increase independence with pain free functional mobility. · Patient demonstrates good rehab potential due to higher previous functional level. Reason for Services/Other Comments:  · Patient continues to require skilled intervention due to increased pain and decreased independence with functional mobility. Use of outcome tool(s) and clinical judgement create a POC that gives a: Difficult prediction of patient's progress: HIGH COMPLEXITY   TREATMENT:   (In addition to Assessment/Re-Assessment sessions the following treatments were rendered)    Aquatic Therapy (45 minutes): Aquatic treatment performed per flow grid for Decreased muscle strength, Decreased endurance and Decreased static/dynamic balance and reactive control.        Aquatic Exercise Log       Date  1-10-17 Date  1-17-17 Date  1-26-17 Date   Date     Activity/ Exercise Parameters Parameters Warm up in 5 feet today Parameters Parameters   Walking forward 2 laps 2 laps 2 laps     Walking backward 2 laps 2 laps 2 laps     Walking sideways 2 laps 2 laps 2 laps       Marching 2 laps 2 laps 2 laps       Goose Step 2 laps 2 laps 2 laps       Tip toes 2 laps 2 laps 2 laps       Heels          Lunges        Side step squats   Standing on noodle with HHA then performed mini squats x 10     LE Exercises noodle noodle noodle       Hip Flex/Ext Marching x 10 B Marching x 12 B Marching x 15 B       Hip Abd/Add X 10 B X 12 B X 15 B       Hip IR/ER          Calf raises          Knee Flex          Squats Knees to chest x 10   2 styles x 10 each       Leg Circles  In 6 feet  X 10 each way each leg        Step Ups        UE Exercises noodle noodle noodle       Squeeze In X 10 B X 12 B X 15 B       Push Down X 10 B X 12 B X 15 B       Pull Down   X 15 B       Bicep/Tricep        Rows/Press outs   Push ups x 10       Chi Positions          Trunk Rotation        Deep H2O/ Noodles noodle noodle noodle       Stabilization          Arms only   Straddle - 2 laps       Legs only Under arms- bicycle Both arms and legs  4 laps Arms and legs- 2 laps     [address] 2 x 10  X 20  X 20        Scissors 2 x 10  X 20  X 20   Combo x 20        Crab walk 2 laps  2 laps     Lower abdominal   work  Knees to chest x 10  2 sequences x 10 each way        Cardio          Jogging        Lap   Swimming          Stretches []  In [place]  [x]  Whirlpool* []  In Pool  [x]  Whirlpool* []  In Pool  [x]  Whirlpool* []  In Pool  []  Whirlpool* []  In Pool  []  Whirlpool*     Hamstrings X 3 B X 3 B X 3 B       Heelcords X 3 B X 3 B X 3 B       Piriformis X 3 B X 3 B X 3 B       Neck                  * For increased soft tissue extensibility a warm water (over 100°F) environment was utilized. Supervision/monitoring was provided at all times. Treatment/Session Assessment:  Leslie Rock reports that her ankle and back felt better upon departure. · Pre-treatment Symptoms:  Leslie Rock notes 7/10 pain throughout body. She reports having twisted her ankle coming down her front steps yesterday. She presents with a slight limp.    · Pain: Initial:    7/10 Post Session:  5-6/10 ·   Compliance with Program/Exercises: Will assess as treatment progresses. · Recommendations/Intent for next treatment session: \"Next visit will focus on advancements to more challenging activities and reduction in assistance provided\".   Total Treatment Duration: 45 minutes   PT Patient Time In/Time Out  Time In: 0930  Time Out: [address], PTA

## 2017-01-27 ENCOUNTER — HOSPITAL ENCOUNTER (OUTPATIENT)
Dept: PHYSICAL THERAPY | Age: 47
Discharge: HOME | End: 2017-01-27
Attending: FAMILY MEDICINE
Payer: COMMERCIAL

## 2017-01-27 PROCEDURE — 97110 THERAPEUTIC EXERCISES: CPT

## 2017-01-27 NOTE — PROGRESS NOTES
Cesar Casarez  : 1970 [facility] [address] [address] [facility] [phone]   [phone]        OUTPATIENT PHYSICAL THERAPY:Daily Note 2017    ICD-10: Treatment Diagnosis: fibromyalgia (M79.7)  Difficulty in walking, not elsewhere classified (R26.2)  Low back pain (M54.5)  Precautions/Allergies:   Percocet [oxycodone-acetaminophen] and Morphine   Fall Risk Score: 4 (? 5 = High Risk)  MD Orders: Evaluate and treat MEDICAL/REFERRING DIAGNOSIS:  Fibromyalgia   DATE OF ONSET: 18, diagnosed in , symptoms progressively getting worse  REFERRING PHYSICIAN: David Brightwy: none scheduled at this time   Cesar Casarez has attended 1 physical therapy session including initial evaluation. INITIAL ASSESSMENT: Assessed 17  Ms. Mk Tanner presents with increased pain and fatigue with diagnosis of fibromyalgia. She has a diagnosis of herniated disc lumbar and cervical spine. She presents with severe self reported fibromyalgia symptoms, severe self reported physical fatigue, moderate self reported overall fatigue (physical, cognitive, and psychosocial), and self reported mild mood disturbance for Plascencia Depression Index. She presents with postural and gait deviations listed in detail below. She presents with increased risk of falling according to Timed Up and Go assessment. She presents with decreased independence with functional mobility and requires assistance from her  for sit to stand transfers, tub transfers, bed mobility, and activities of daily living. She would benefit from skilled physical therapy in order to improve pain free independence with functional mobility, reduce fall risk and prevent future impairment. PROBLEM LIST (Impacting functional limitations):  1. Decreased Strength  2. Decreased ADL/Functional Activities  3. Decreased Transfer Abilities  4.  Decreased Ambulation Ability/Technique  5. Decreased Balance  6. Increased Pain  7. Decreased Activity Tolerance  8. Decreased Pacing Skills  9. Decreased Work Simplification/Energy Conservation Techniques  10. Increased Fatigue  11. Decreased Flexibility/Joint Mobility  12. Decreased Harbinger with Home Exercise Program INTERVENTIONS PLANNED:  1. Balance Exercise  2. Bed Mobility  3. Cold  4. Family Education  5. Gait Training  6. Heat  7. Home Exercise Program (HEP)  8. Manual Therapy  9. Neuromuscular Re-education/Strengthening  10. Range of Motion (ROM)  11. Therapeutic Activites  12. Therapeutic Exercise/Strengthening  13. Transfer Training  14. aquatic therapy   TREATMENT PLAN:  Effective Dates: 1/4/17 TO 4/4/17. Frequency/Duration: 2 times a week for 8 weeks  GOALS: (Goals have been discussed and agreed upon with patient.)  DISCHARGE GOALS: Time Frame: 8 weeks  Patient will be independent with home exercise program within 8 weeks in order to improve independence with management of patient's symptoms and/or functional deficits. Patient will improve their fibromyalgia impact scale score to less than 60 points within 8 weeks in order to improve activity tolerance and pain free independence with activities of daily living and functional mobility. Patient will improve their physical subscale score for modified fatigue impact scale to 28 points within 8 weeks in order to improve activity tolerance. Rehabilitation Potential For Stated Goals: Good  Regarding Akin Florentino Gan's therapy, I certify that the treatment plan above will be carried out by a therapist or under their direction. Thank you for this referral,  Anna Bacon PTA     Referring Physician Signature: Danny Haile *              Date                    HISTORY:   History of Present Injury/Illness (Reason for Referral):  Francisca  notes she has been diagnosed with fibromyalgia since 1992. She notes symptoms are progressively getting worse. She notes she is finding it harder to push through the symptoms. She requires assistance with functional transfers, bed mobility, and activities of daily living. She does work full time but notes frequent days she misses due to fibromyalgia symptoms. She notes she missed 7 days in December 2016. She reports pain in low back as nerve pain being pulled and tingling pain. She notes pain in posterior legs with straight leg raise and slump test this date. She declined to move low back during movement assessment due to fear of pain. 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 Job requirements include prolonged sitting, computer use, phone use  Ethan Dubose requires assistance from  to stand from any surface, bed mobility, get in/out of car, bathe (get in/out of tub), cook, clean, dress at times  Personal Factors:          Sex:  female        Age:  42 y.o. Current Medications: toprimate, trazadone, vitamin D2, diaxapam, flexeril, loritab, ambien   Date Last Reviewed:  1/27/2017   Number of Personal Factors/Comorbidities that affect the Plan of Care: 3+: HIGH COMPLEXITY   EXAMINATION:   Observation/Orthostatic Postural Assessment:  Assessed 1/4/17:  Ethan Dubose sits with forward head and rounded shoulders which indicate tight anterior chest musculature, upper trapezius, and levator scapula and weak posterior scapula musculature and deep cervical flexors.  She stands with left iliac crest superior to right, left lower extremity longer in supine (unable to assess in long sitting due to pain). She presents with convex curve to left in thoracic region, convex curve to right in lumbar spine. Right shoulder inferior to left,   Palpation:  Assessed 1/4/17        Luz Garcia is tender to palpate whole body throughout evaluation  ROM:  Assessed 1/4/17  Dorsiflexion to neutral with knee extended - limited by pain in posterior thighs and low back          Selective Functional Movement Assessment  Nonfunctional and painful  Cervical flexion, extension, rotation/side bend bilaterally 75% - pain mid back with all motions  appleys scratch test external rotation to T4 bilaterally - pain in forearms  appleys scratch test internal rotation to thoracolumbar region bilaterally - pain in shoulders  Multisegmental flexion and extension - decline to perform due to fear of increased pain  Multisegmental rotation bilaterally - 10% pain L5 region  Single leg stance less than 3 seconds bilaterally - performed independently  Strength:  Assessed 1/4/17        3+/5 all major muscle groups bilateral upper and lower extremities   Special Tests:  Assessed 1/4/17:        Slump test - positive bilaterally  Straight leg raise - positive bilaterally 20 degrees  Neurological Screen: Assessed 1/4/17         Myotomes:  Weak (limited by pain) but symmetrical bilateral lower and upper extremities         Dermatomes:  Tingling noted bilateral lower extremities, feet, hands        Reflexes:   Will assess as able  Functional Mobility: Assessed 1/4/17:        Gait/Ambulation:  Luz Garcia ambulates with modified independence, she ambulates with decreased step length bilaterally, decreased heel strike at initial contact, decreased hip extension/internal rotation during terminal stance        Transfers:  Luz Garcia required moderate assistance with sit to stand transfer this date, she placed hands on thighs to assist with standing upright        Bed Mobility:  Luz Garcia was able to perform bed mobility with modified independence -she used hands to assist lower extremities onto rehab mat - she notes she usually has moderate assistance with bed mobility when at home        Stairs:  Joann Heather notes she requires moderate assistance with stair negotiation at home  Balance:  Assessed 1/4/17        Tool Used: Timed Up and Go (TUG)  Score:  Initial: 30 seconds    Interpretation of Score: The test measures, in seconds, the time taken by an individual to stand up from a standard arm chair (seat height 46 cm [18 in], arm height 65 cm [25.6 in]), walk a distance of 3 meters (118 in, approx 10 ft), turn, walk back to the chair and sit down. If the individual takes longer than 14 seconds to complete TUG, this indicates risk for falls. Score 7 7.5-10.5 11-14 14.5-17.5 18-21 21.5-24.5 25+   Modifier CH CI CJ CK CL CM CN     Mental Status:  Assessed 1/4/17        Alert and oriented x 4  Vitals: Will assess as able     Body Structures Involved:  1. Nerves  2. Bones  3. Joints  4. Muscles  5. Ligaments Body Functions Affected:  1. Sensory/Pain  2. Neuromusculoskeletal  3. Movement Related  4. Skin Related Activities and Participation Affected:  1. Learning and Applying Knowledge  2. General Tasks and Demands  3. Mobility  4. Self Care  5. Domestic Life  6. Interpersonal Interactions and Relationships  7.  Community, Social and Petal Brewer   Number of elements that affect the Plan of Care: 4+: HIGH COMPLEXITY   CLINICAL PRESENTATION:   Presentation: Evolving clinical presentation with unstable and unpredictable characteristics: HIGH COMPLEXITY   CLINICAL DECISION MAKING:   Outcome Measure: Assessed 1/4/17    Tool Used: Fibromyalgia Impact Questionnaire  Score:  Initial: 72.73    Interpretation of Score: <40 = Mild Fibromyalgia Syndrome   40-60 = Moderate Fibromyalgia Syndrome   >60-70 = Severe Fibromyalgia Syndrome  Score 0 1-18 19-37 38-57 58-77 78-96 97   Modifier CH CI CJ CK CL CM CN     Tool Used: Plascencia Depression Index   Score:  Initial: 14    Interpretation of Score: 0-10 = ups and downs considered normal   11-16= mild mood disturbance   17-20= borderline clinical depression   21-30= moderate depression   31-40= severe depression   Over 40= extreme depression     Tool Used: Modified Fatigue Impact Scale  Score:  Initial: Total score: 42/84  Physical subscale score: 32  Cognitive subscale score: 3  Psychosocial subscale score: 7      Tool Used: 6-MINUTE WALK TEST  Score:  Initial: 400 feet - one standing rest    Interpretation of Score: Normal range varies but is approximately 6734-7315 Feet    Medical Necessity:   · Patient is expected to demonstrate progress in strength, range of motion, balance, coordination and functional technique to decrease assistance required with functional mobility and increase independence with pain free functional mobility. · Patient demonstrates good rehab potential due to higher previous functional level. Reason for Services/Other Comments:  · Patient continues to require skilled intervention due to increased pain and decreased independence with functional mobility. Use of outcome tool(s) and clinical judgement create a POC that gives a: Difficult prediction of patient's progress: HIGH COMPLEXITY   TREATMENT:     THERAPEUTIC EXERCISE: (35 minutes):  Exercises per grid below to improve mobility, strength and balance. Date:  1-2-27-17 Date:   Date:     Activity/Exercise Parameters Parameters Parameters   Nustep  Level 2  10 minutes  With moist heat to increase mobility     Hamstring stretch With strap   3 x 20 sec hold B     Piriformis stretch 3 x 20 sec hold B     Pelvic tilt 10 x 5 sec hold      Lower trunk rotation 5 x 10 sec hold B                 HEP handout given to patient for home use. MODALITIES: (10  minutes): Moist heat to back concurrent with Nustep to decrease pain and increase mobility.  Skin was clear and intact     Treatment/Session Assessment: Cesar Casarez from current symptoms. · Pre-treatment Symptoms:  Cesar Casarez notes 8/10 pain throughout body - mostly in back and around the front of both hips. Patient reports she will be gone next week to [place]. · Pain: Initial:    8-9/10. She rates her ankle is better after her therapy tomorrow. Post Session: 8 /10 ·   Compliance with Program/Exercises: Will assess as treatment progresses. · Recommendations/Intent for next treatment session: \"Next visit will focus on advancements to more challenging activities and reduction in assistance provided\".   Total Treatment Duration: 35 minutes   PT Patient Time In/Time Out  Time In: 1445 (Patietn was 15 minutes late)  Time Out: [address], PTA

## 2017-01-31 ENCOUNTER — APPOINTMENT (OUTPATIENT)
Dept: PHYSICAL THERAPY | Age: 47
End: 2017-01-31
Attending: FAMILY MEDICINE
Payer: COMMERCIAL

## 2017-02-03 ENCOUNTER — APPOINTMENT (OUTPATIENT)
Dept: PHYSICAL THERAPY | Age: 47
End: 2017-02-03
Attending: FAMILY MEDICINE
Payer: COMMERCIAL

## 2017-02-07 ENCOUNTER — HOSPITAL ENCOUNTER (OUTPATIENT)
Dept: PHYSICAL THERAPY | Age: 47
End: 2017-02-07
Attending: FAMILY MEDICINE
Payer: COMMERCIAL

## 2017-02-07 ENCOUNTER — HOSPITAL ENCOUNTER (OUTPATIENT)
Dept: PHYSICAL THERAPY | Age: 47
Discharge: HOME | End: 2017-02-07
Attending: FAMILY MEDICINE
Payer: COMMERCIAL

## 2017-02-07 PROCEDURE — 97113 AQUATIC THERAPY/EXERCISES: CPT

## 2017-02-10 ENCOUNTER — HOSPITAL ENCOUNTER (OUTPATIENT)
Dept: PHYSICAL THERAPY | Age: 47
Discharge: HOME | End: 2017-02-10
Attending: FAMILY MEDICINE
Payer: COMMERCIAL

## 2017-02-10 PROCEDURE — 97110 THERAPEUTIC EXERCISES: CPT

## 2017-02-10 NOTE — PROGRESS NOTES
Nathaly Dang  : 1970 [facility] [place] [address] [facility]  [phone]   [phone]        OUTPATIENT PHYSICAL THERAPY:Progress Report 2017    ICD-10: Treatment Diagnosis: fibromyalgia (M79.7)  Difficulty in walking, not elsewhere classified (R26.2)  Low back pain (M54.5)  Precautions/Allergies:   Percocet [oxycodone-acetaminophen] and Morphine   Fall Risk Score: 4 (? 5 = High Risk)  MD Orders: Evaluate and treat MEDICAL/REFERRING DIAGNOSIS:  Fibromyalgia   DATE OF ONSET: 18, diagnosed in , symptoms progressively getting worse  REFERRING PHYSICIAN: [address]: none scheduled at this time   Nathaly Dang has attended 7 physical therapy sessions including initial evaluation. INITIAL ASSESSMENT: Assessed 2/10/17   Ms. Nico Garcia presents with severe self reported fibromyalgia symptoms (decreasing from initial score), severe self reported physical fatigue, moderate self reported overall fatigue (physical, cognitive, and psychosocial), and self reported normal ups and downs for Plascencia Depression Index. She presents with postural and gait deviations listed in detail below. She presents with improved timed up and go assessment score with decreased risk of falling. She presents with improved independence with functional mobility this date. She donned high heeled wedges this date. She notes 8 - 9/10 pain level but no facial grimacing throughout therapy session. She would benefit from skilled physical therapy in order to improve pain free independence with functional mobility, reduce fall risk and prevent future impairment. PROBLEM LIST (Impacting functional limitations):  1. Decreased Strength  2. Decreased ADL/Functional Activities  3. Decreased Transfer Abilities  4. Decreased Ambulation Ability/Technique  5. Decreased Balance  6. Increased Pain  7. Decreased Activity Tolerance  8.  Decreased Pacing Skills  9. Decreased Work Simplification/Energy Conservation Techniques  10. Increased Fatigue  11. Decreased Flexibility/Joint Mobility  12. Decreased [place] with Home Exercise Program INTERVENTIONS PLANNED:  1. Balance Exercise  2. Bed Mobility  3. Cold  4. Family Education  5. Gait Training  6. Heat  7. Home Exercise Program (HEP)  8. Manual Therapy  9. Neuromuscular Re-education/Strengthening  10. Range of Motion (ROM)  11. Therapeutic Activites  12. Therapeutic Exercise/Strengthening  13. Transfer Training  14. aquatic therapy   TREATMENT PLAN:  Effective Dates: 1/4/17 TO 4/4/17. Frequency/Duration: 2 times a week for 8 weeks  GOALS: (Goals have been discussed and agreed upon with patient.)  DISCHARGE GOALS: Time Frame: 8 weeks  Patient will be independent with home exercise program within 8 weeks in order to improve independence with management of patient's symptoms and/or functional deficits. (CONTINUE 2/10/17)  Patient will improve their fibromyalgia impact scale score to less than 60 points within 8 weeks in order to improve activity tolerance and pain free independence with activities of daily living and functional mobility. (CONTINUE 2/10/17)  Patient will improve their physical subscale score for modified fatigue impact scale to 28 points within 8 weeks in order to improve activity tolerance. (CONTINUE 2/10/17)    Rehabilitation Potential For Stated Goals: Good             HISTORY:   History of Present Injury/Illness (Reason for Referral):  Radha Lianna notes she has been diagnosed with fibromyalgia since 1992. She notes symptoms are progressively getting worse. She notes she is finding it harder to push through the symptoms. She requires assistance with functional transfers, bed mobility, and activities of daily living. She does work full time but notes frequent days she misses due to fibromyalgia symptoms. She notes she missed 7 days in December 2016.  She reports pain in low back as nerve pain being pulled and tingling pain. She notes pain in posterior legs with straight leg raise and slump test this date. She declined to move low back during movement assessment due to fear of pain. 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 Job requirements include prolonged sitting, computer use, phone use  Anton Simpson requires assistance from  to stand from any surface, bed mobility, get in/out of car, bathe (get in/out of tub), cook, clean, dress at times  Personal Factors:          Sex:  female        Age:  41 y.o. Current Medications: toprimate, trazadone, vitamin D2, diaxapam, flexeril, loritab, ambien   Date Last Reviewed:  2/13/2017   Number of Personal Factors/Comorbidities that affect the Plan of Care: 3+: HIGH COMPLEXITY   EXAMINATION:   Observation/Orthostatic Postural Assessment:  Assessed 1/4/17:   Anton Simpson sits with forward head and rounded shoulders which indicate tight anterior chest musculature, upper trapezius, and levator scapula and weak posterior scapula musculature and deep cervical flexors. She stands with left iliac crest superior to right, left lower extremity longer in supine (unable to assess in long sitting due to pain). She presents with convex curve to left in thoracic region, convex curve to right in lumbar spine.  Right shoulder inferior to left,   Palpation:  Assessed 1/4/17        Anton Simpson is tender to palpate whole body throughout evaluation  ROM:  Assessed 1/4/17  Dorsiflexion to neutral with knee extended - limited by pain in posterior thighs and low back          Selective Functional Movement Assessment  Nonfunctional and painful  Cervical flexion, extension, rotation/side bend bilaterally 75% - pain mid back with all motions  appleys scratch test external rotation to T4 bilaterally - pain in forearms  appleys scratch test internal rotation to thoracolumbar region bilaterally - pain in shoulders  Multisegmental flexion and extension - decline to perform due to fear of increased pain  Multisegmental rotation bilaterally - 10% pain L5 region  Single leg stance less than 3 seconds bilaterally - performed independently  Strength:  Assessed 1/4/17        3+/5 all major muscle groups bilateral upper and lower extremities   Special Tests:  Assessed 1/4/17:        Slump test - positive bilaterally  Straight leg raise - positive bilaterally 20 degrees  Neurological Screen: Assessed 1/4/17         Myotomes:  Weak (limited by pain) but symmetrical bilateral lower and upper extremities         Dermatomes:  Tingling noted bilateral lower extremities, feet, hands        Reflexes:   Will assess as able  Functional Mobility: Assessed 1/4/17:        Gait/Ambulation:  Mario Edwards ambulates with modified independence, she ambulates with decreased step length bilaterally, decreased heel strike at initial contact, decreased hip extension/internal rotation during terminal stance        Transfers:  Mario Edwards required moderate assistance with sit to stand transfer this date, she placed hands on thighs to assist with standing upright        Bed Mobility:  Mario Edwards was able to perform bed mobility with modified independence -she used hands to assist lower extremities onto rehab mat - she notes she usually has moderate assistance with bed mobility when at home        Stairs:  Mario Edwards notes she requires moderate assistance with stair negotiation at home  Balance:  Assessed 2/10/17        Tool Used: Timed Up and Go (TUG)  Score:  Initial: 30 seconds 17.39 seconds   Interpretation of Score: The test measures, in seconds, the time taken by an individual to stand up from a standard arm chair (seat height 46 cm [18 in], arm height 65 cm [25.6 in]), walk a distance of 3 meters (118 in, approx 10 ft), turn, walk back to the chair and sit down. If the individual takes longer than 14 seconds to complete TUG, this indicates risk for falls. Score 7 7.5-10.5 11-14 14.5-17.5 18-21 21.5-24.5 25+   Modifier CH CI CJ CK CL CM CN     Mental Status:  Assessed 1/4/17        Alert and oriented x 4  Vitals: Will assess as able     Body Structures Involved:  1. Nerves  2. Bones  3. Joints  4. Muscles  5. Ligaments Body Functions Affected:  1. Sensory/Pain  2. Neuromusculoskeletal  3. Movement Related  4. Skin Related Activities and Participation Affected:  1. Learning and Applying Knowledge  2. General Tasks and Demands  3. Mobility  4. Self Care  5. Domestic Life  6. Interpersonal Interactions and Relationships  7.  Community, Social and [place]   Number of elements that affect the Plan of Care: 4+: HIGH COMPLEXITY   CLINICAL PRESENTATION:   Presentation: Evolving clinical presentation with unstable and unpredictable characteristics: HIGH COMPLEXITY   CLINICAL DECISION MAKING:   Outcome Measure: Assessed 2/10/17    Tool Used: Fibromyalgia Impact Questionnaire  Score:  Initial: 72.73 68.88   Interpretation of Score: <40 = Mild Fibromyalgia Syndrome   40-60 = Moderate Fibromyalgia Syndrome   >60-70 = Severe Fibromyalgia Syndrome  Score 0 1-18 19-37 38-57 58-77 78-96 97   Modifier CH CI CJ CK CL CM CN     Tool Used: Plascencia Depression Index   Score:  Initial: 14 10   Interpretation of Score: 0-10 = ups and downs considered normal   11-16= mild mood disturbance   17-20= borderline clinical depression   21-30= moderate depression   31-40= severe depression   Over 40= extreme depression     Tool Used: Modified Fatigue Impact Scale  Score:  Initial: Total score: 42/84  Physical subscale score: 32  Cognitive subscale score: 3  Psychosocial subscale score: 7 Total score: 42/84  Physical subscale score: 32  Cognitive subscale score: 6  psychosocial subscale score: 4     Tool Used: 6-MINUTE WALK TEST  Score:  Initial: 400 feet - one standing rest 800 feet - no rests    Interpretation of Score: Normal range varies but is approximately 8565-0747 Feet    Medical Necessity:   · Patient is expected to demonstrate progress in strength, range of motion, balance, coordination and functional technique to decrease assistance required with functional mobility and increase independence with pain free functional mobility. · Patient demonstrates good rehab potential due to higher previous functional level. Reason for Services/Other Comments:  · Patient continues to require skilled intervention due to increased pain and decreased independence with functional mobility. Use of outcome tool(s) and clinical judgement create a POC that gives a: Difficult prediction of patient's progress: HIGH COMPLEXITY   TREATMENT:   Therapeutic Exercise: ( 24 minutes):  Exercises per grid below to improve mobility, strength and coordination. Required minimal visual and verbal cues to promote proper body alignment and promote proper body posture. Progressed complexity of movement as indicated.      Date:  1-2-27-17 Date:  2/10/17 Date:     Activity/Exercise Parameters Parameters Parameters   Nustep  Level 2  10 minutes  With moist heat to increase mobility Level 2  10 minutes  With moist head to increase mobility     Hamstring stretch With strap   3 x 20 sec hold B 3 sets 30 seconds bilaterally    Piriformis stretch 3 x 20 sec hold B 3 sets 30 seconds bilaterally     Pelvic tilt 10 x 5 sec hold      Lower trunk rotation 5 x 10 sec hold B 10 repetitions with 10 second hold bilaterally     Walking for 6 minutes for improved activity tolerance with functional mobility - cues for core activation and glut activation  6 minutes - 800 feet, no rests            Treatment/Session Assessment:   · Pre-treatment Symptoms:  Radha Dsouza notes 8/10 pain in low back - she dons high heeled wedges   · Pain: Initial:  Pain Intensity 1: 8  Pain Location 1: Back  Pain Orientation 1: Lower  Pain Intervention(s) 1: Exercise   Post Session: 9/10, she notes slight increase in pain following 6 minute walk test - no facial grimacing throughout session ·   Compliance with Program/Exercises: Will assess as treatment progresses. · Recommendations/Intent for next treatment session: \"Next visit will focus on advancements to more challenging activities and reduction in assistance provided\".   Total Treatment Duration:    PT Patient Time In/Time Out  Time In: 8900  Time Out: 4481 [facility],

## 2017-02-14 ENCOUNTER — APPOINTMENT (OUTPATIENT)
Dept: PHYSICAL THERAPY | Age: 47
End: 2017-02-14
Attending: FAMILY MEDICINE
Payer: COMMERCIAL

## 2017-02-14 PROCEDURE — 97113 AQUATIC THERAPY/EXERCISES: CPT

## 2017-02-14 NOTE — PROGRESS NOTES
Francisca Askew  : [facility] [address]  [phone]   [phone]        OUTPATIENT PHYSICAL THERAPY:Daily Note and Aquatic Note 2017    ICD-10: Treatment Diagnosis: fibromyalgia (M79.7)  Difficulty in walking, not elsewhere classified (R26.2)  Low back pain (M54.5)  Precautions/Allergies:   Percocet [oxycodone-acetaminophen] and Morphine   Fall Risk Score: 4 (? 5 = High Risk)  MD Orders: Evaluate and treat MEDICAL/REFERRING DIAGNOSIS:  Fibromyalgia   DATE OF ONSET: 18, diagnosed in , symptoms progressively getting worse  REFERRING PHYSICIAN: [address]: none scheduled at this time   Luiszenia Floyd Roy [address]: Assessed 17  Ms. Marilynn Valenzuela presents with increased pain and fatigue with diagnosis of fibromyalgia. She has a diagnosis of herniated disc lumbar and cervical spine. She presents with severe self reported fibromyalgia symptoms, severe self reported physical fatigue, moderate self reported overall fatigue (physical, cognitive, and psychosocial), and self reported mild mood disturbance for Plascencia Depression Index. She presents with postural and gait deviations listed in detail below. She presents with increased risk of falling according to Timed Up and Go assessment. She presents with decreased independence with functional mobility and requires assistance from her  for sit to stand transfers, tub transfers, bed mobility, and activities of daily living. She would benefit from skilled physical therapy in order to improve pain free independence with functional mobility, reduce fall risk and prevent future impairment. PROBLEM LIST (Impacting functional limitations):  1. Decreased Strength  2. Decreased ADL/Functional Activities  3. Decreased Transfer Abilities  4. Decreased Ambulation Ability/Technique  5. Decreased Balance  6.  Increased Pain  7. Decreased Activity Tolerance  8. Decreased Pacing Skills  9. Decreased Work Simplification/Energy Conservation Techniques  10. Increased Fatigue  11. Decreased Flexibility/Joint Mobility  12. Decreased Evangeline with Home Exercise Program INTERVENTIONS PLANNED:  1. Balance Exercise  2. Bed Mobility  3. Cold  4. Family Education  5. Gait Training  6. Heat  7. Home Exercise Program (HEP)  8. Manual Therapy  9. Neuromuscular Re-education/Strengthening  10. Range of Motion (ROM)  11. Therapeutic Activites  12. Therapeutic Exercise/Strengthening  13. Transfer Training  14. aquatic therapy   TREATMENT PLAN:  Effective Dates: 1/4/17 TO 4/4/17. Frequency/Duration: 2 times a week for 8 weeks  GOALS: (Goals have been discussed and agreed upon with patient.)  DISCHARGE GOALS: Time Frame: 8 weeks  Patient will be independent with home exercise program within 8 weeks in order to improve independence with management of patient's symptoms and/or functional deficits. Patient will improve their fibromyalgia impact scale score to less than 60 points within 8 weeks in order to improve activity tolerance and pain free independence with activities of daily living and functional mobility. Patient will improve their physical subscale score for modified fatigue impact scale to 28 points within 8 weeks in order to improve activity tolerance. Rehabilitation Potential For Stated Goals: Good  Regarding Alexandro Landryhoun's therapy, I certify that the treatment plan above will be carried out by a therapist or under their direction. Thank you for this referral,  Cheryle Forge, PTA     Referring Physician Signature: Pita Torres *              Date                    HISTORY:   History of Present Injury/Illness (Reason for Referral):  Coy Nolasco notes she has been diagnosed with fibromyalgia since 1992. She notes symptoms are progressively getting worse.  She notes she is finding it harder to push through the symptoms. She requires assistance with functional transfers, bed mobility, and activities of daily living. She does work full time but notes frequent days she misses due to fibromyalgia symptoms. She notes she missed 7 days in December 2016. She reports pain in low back as nerve pain being pulled and tingling pain. She notes pain in posterior legs with straight leg raise and slump test this date. She declined to move low back during movement assessment due to fear of pain. 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 Job requirements include prolonged sitting, computer use, phone use  Santi Crowe requires assistance from  to stand from any surface, bed mobility, get in/out of car, bathe (get in/out of tub), cook, clean, dress at times  Personal Factors:          Sex:  female        Age:  41 y.o. Current Medications: toprimate, trazadone, vitamin D2, diaxapam, flexeril, loritab, ambien   Date Last Reviewed:  2/14/2017   Number of Personal Factors/Comorbidities that affect the Plan of Care: 3+: HIGH COMPLEXITY   EXAMINATION:   Observation/Orthostatic Postural Assessment:  Assessed 1/4/17:  Santi Crowe sits with forward head and rounded shoulders which indicate tight anterior chest musculature, upper trapezius, and levator scapula and weak posterior scapula musculature and deep cervical flexors. She stands with left iliac crest superior to right, left lower extremity longer in supine (unable to assess in long sitting due to pain).  She presents with convex curve to left in thoracic region, convex curve to right in lumbar spine. Right shoulder inferior to left,   Palpation:  Assessed 1/4/17        Gena Flower is tender to palpate whole body throughout evaluation  ROM:  Assessed 1/4/17  Dorsiflexion to neutral with knee extended - limited by pain in posterior thighs and low back          Selective Functional Movement Assessment  Nonfunctional and painful  Cervical flexion, extension, rotation/side bend bilaterally 75% - pain mid back with all motions  appleys scratch test external rotation to T4 bilaterally - pain in forearms  appleys scratch test internal rotation to thoracolumbar region bilaterally - pain in shoulders  Multisegmental flexion and extension - decline to perform due to fear of increased pain  Multisegmental rotation bilaterally - 10% pain L5 region  Single leg stance less than 3 seconds bilaterally - performed independently  Strength:  Assessed 1/4/17        3+/5 all major muscle groups bilateral upper and lower extremities   Special Tests:  Assessed 1/4/17:        Slump test - positive bilaterally  Straight leg raise - positive bilaterally 20 degrees  Neurological Screen: Assessed 1/4/17         Myotomes:  Weak (limited by pain) but symmetrical bilateral lower and upper extremities         Dermatomes:  Tingling noted bilateral lower extremities, feet, hands        Reflexes:   Will assess as able  Functional Mobility: Assessed 1/4/17:        Gait/Ambulation:  Gena Flower ambulates with modified independence, she ambulates with decreased step length bilaterally, decreased heel strike at initial contact, decreased hip extension/internal rotation during terminal stance        Transfers:  Gena Flower required moderate assistance with sit to stand transfer this date, she placed hands on thighs to assist with standing upright        Bed Mobility:  Gena Flower was able to perform bed mobility with modified independence -she used hands to assist lower extremities onto rehab mat - she notes she usually has moderate assistance with bed mobility when at home        Stairs:  Santi Crowe notes she requires moderate assistance with stair negotiation at home  Balance:  Assessed 1/4/17        Tool Used: Timed Up and Go (TUG)  Score:  Initial: 30 seconds    Interpretation of Score: The test measures, in seconds, the time taken by an individual to stand up from a standard arm chair (seat height 46 cm [18 in], arm height 65 cm [25.6 in]), walk a distance of 3 meters (118 in, approx 10 ft), turn, walk back to the chair and sit down. If the individual takes longer than 14 seconds to complete TUG, this indicates risk for falls. Score 7 7.5-10.5 11-14 14.5-17.5 18-21 21.5-24.5 25+   Modifier CH CI CJ CK CL CM CN     Mental Status:  Assessed 1/4/17        Alert and oriented x 4  Vitals: Will assess as able     Body Structures Involved:  1. Nerves  2. Bones  3. Joints  4. Muscles  5. Ligaments Body Functions Affected:  1. Sensory/Pain  2. Neuromusculoskeletal  3. Movement Related  4. Skin Related Activities and Participation Affected:  1. Learning and Applying Knowledge  2. General Tasks and Demands  3. Mobility  4. Self Care  5. Domestic Life  6. Interpersonal Interactions and Relationships  7.  Community, Social and [place]   Number of elements that affect the Plan of Care: 4+: HIGH COMPLEXITY   CLINICAL PRESENTATION:   Presentation: Evolving clinical presentation with unstable and unpredictable characteristics: HIGH COMPLEXITY   CLINICAL DECISION MAKING:   Outcome Measure: Assessed 1/4/17    Tool Used: Fibromyalgia Impact Questionnaire  Score:  Initial: 72.73    Interpretation of Score: <40 = Mild Fibromyalgia Syndrome   40-60 = Moderate Fibromyalgia Syndrome   >60-70 = Severe Fibromyalgia Syndrome  Score 0 1-18 19-37 38-57 58-77 78-96 97   Modifier CH CI CJ CK CL CM CN     Tool Used: Plascencia Depression Index   Score:  Initial: 14 Interpretation of Score: 0-10 = ups and downs considered normal   11-16= mild mood disturbance   17-20= borderline clinical depression   21-30= moderate depression   31-40= severe depression   Over 40= extreme depression     Tool Used: Modified Fatigue Impact Scale  Score:  Initial: Total score: 42/84  Physical subscale score: 32  Cognitive subscale score: 3  Psychosocial subscale score: 7      Tool Used: 6-MINUTE WALK TEST  Score:  Initial: 400 feet - one standing rest    Interpretation of Score: Normal range varies but is approximately 3664-6611 Feet    Medical Necessity:   · Patient is expected to demonstrate progress in strength, range of motion, balance, coordination and functional technique to decrease assistance required with functional mobility and increase independence with pain free functional mobility. · Patient demonstrates good rehab potential due to higher previous functional level. Reason for Services/Other Comments:  · Patient continues to require skilled intervention due to increased pain and decreased independence with functional mobility. Use of outcome tool(s) and clinical judgement create a POC that gives a: Difficult prediction of patient's progress: HIGH COMPLEXITY   TREATMENT:   (In addition to Assessment/Re-Assessment sessions the following treatments were rendered)    Aquatic Therapy (45 minutes): Aquatic treatment performed per flow grid for Decreased muscle strength, Decreased endurance and Decreased static/dynamic balance and reactive control.        Aquatic Exercise Log       Date  1-10-17 Date  1-17-17 Date  1-26-17 Date  2-7-17 Date  2-14-17   Activity/ Exercise Parameters Parameters Warm up in 5 feet today Parameters Parameters   Walking forward 2 laps 2 laps 2 laps 2 laps 2 laps   Walking backward 2 laps 2 laps 2 laps 2 laps 2 laps   Walking sideways 2 laps 2 laps 2 laps 2 laps 2 laps     Marching 2 laps 2 laps 2 laps 2 laps 2 laps     Goose Step 2 laps 2 laps 2 laps 2 laps 2 laps     Tip toes 2 laps 2 laps 2 laps       Heels          Lunges        Side step squats   Standing on noodle with HHA then performed mini squats x 10     LE Exercises noodle noodle noodle noodle noodle     Hip Flex/Ext Marching x 10 B Marching x 12 B Marching x 15 B Marching x 20 B Marching x 20 B     Hip Abd/Add X 10 B X 12 B X 15 B X 20 B X 20 B     Hip IR/ER          Calf raises          Knee Flex          Squats Knees to chest x 10   2 styles x 10 each       Leg Circles  In 6 feet  X 10 each way each leg  X 10 each way each leg X 10 each way each leg     Step Ups        UE Exercises noodle noodle noodle noodle noodle     Squeeze In X 10 B X 12 B X 15 B X 20 B X 20 B     Push Down X 10 B X 12 B X 15 B X 20 B X 20 B     Pull Down   X 15 B X 20 B X 20 B     Bicep/Tricep        Rows/Press outs   Push ups x 10   Push ups x 20     Chi Positions          Trunk Rotation        Deep H2O/ Noodles noodle noodle noodle noodle noodle     Stabilization          Arms only   Straddle - 2 laps Straddle - 2 laps Straddle - 2 laps     Legs only Under arms- bicycle Both arms and legs  4 laps Arms and legs- 2 laps Straddle - 2 laps  Both - 2 laps Straddle - 2 laps  Both - 2 laps   Cross   [place] 2 x 10  X 20  X 20  2 x 20  2 x 20      Scissors 2 x 10  X 20  X 20   Combo x 20  2 x 20   Combo x 20  2 x 20   Combo x 20      Crab walk 2 laps  2 laps Straddle - 4 laps Straddle - 2 laps   Lower abdominal   work  Knees to chest x 10  2 sequences x 10 each way  2 sequences x 10 each way 2 sequences x 10 each way     Cardio          Jogging     In palce   Lap   Swimming          Stretches []  In Pool  [x]  Whirlpool* []  In Pool  [x]  Whirlpool* []  In Pool  [x]  Whirlpool* []  In Pool  [x]  Whirlpool* []  In Pool  [x]  Whirlpool*     Hamstrings X 3 B X 3 B X 3 B X 3 B X 3 B     Heelcords X 3 B X 3 B X 3 B X 3 B X 3 B     Piriformis X 3 B X 3 B X 3 B X 3 B X 3 B     Neck                  * For increased soft tissue extensibility a warm water (over 100°F) environment was utilized. Supervision/monitoring was provided at all times. Treatment/Session Assessment:  Corrine Jaime reports feeling tired, but no fatigue is noted. · Pre-treatment Symptoms:  Corrine Jaime notes pain today to be 7/10. No major changes this past week. · Pain: Initial:    7/10. Post Session:  5-6/10 No throbbing today ·   Compliance with Program/Exercises: Patient reports she is doing her HEP at home, but is thinking about a place that might have a pool for her to use this spring and summer. · Recommendations/Intent for next treatment session: \"Next visit will focus on advancements to more challenging activities and reduction in assistance provided\".   Total Treatment Duration: 45 minutes   PT Patient Time In/Time Out  Time In: 1045  Time Out: 189 Anjel Rebollar, PTA   2/14/2017

## 2017-02-16 ENCOUNTER — HOSPITAL ENCOUNTER (OUTPATIENT)
Dept: PHYSICAL THERAPY | Age: 47
Discharge: HOME | End: 2017-02-16
Attending: FAMILY MEDICINE
Payer: COMMERCIAL

## 2017-02-16 PROCEDURE — 97113 AQUATIC THERAPY/EXERCISES: CPT

## 2017-02-17 ENCOUNTER — APPOINTMENT (OUTPATIENT)
Dept: PHYSICAL THERAPY | Age: 47
End: 2017-02-17
Attending: FAMILY MEDICINE
Payer: COMMERCIAL

## 2017-02-17 NOTE — PROGRESS NOTES
Jeanine Kramer  : 1970 [facility] at [address], [facility]  [phone]   [phone]        OUTPATIENT PHYSICAL THERAPY:Daily Note and Aquatic Note 2017    ICD-10: Treatment Diagnosis: fibromyalgia (M79.7)  Difficulty in walking, not elsewhere classified (R26.2)  Low back pain (M54.5)  Precautions/Allergies:   Percocet [oxycodone-acetaminophen] and Morphine   Fall Risk Score: 4 (? 5 = High Risk)  MD Orders: Evaluate and treat MEDICAL/REFERRING DIAGNOSIS:  Fibromyalgia   DATE OF ONSET: 18, diagnosed in , symptoms progressively getting worse  REFERRING PHYSICIAN: [address]: none scheduled at this time   Luiszenia Floyd Roy 178: Assessed 17  Ms. Anson Jolly presents with increased pain and fatigue with diagnosis of fibromyalgia. She has a diagnosis of herniated disc lumbar and cervical spine. She presents with severe self reported fibromyalgia symptoms, severe self reported physical fatigue, moderate self reported overall fatigue (physical, cognitive, and psychosocial), and self reported mild mood disturbance for Plascencia Depression Index. She presents with postural and gait deviations listed in detail below. She presents with increased risk of falling according to Timed Up and Go assessment. She presents with decreased independence with functional mobility and requires assistance from her  for sit to stand transfers, tub transfers, bed mobility, and activities of daily living. She would benefit from skilled physical therapy in order to improve pain free independence with functional mobility, reduce fall risk and prevent future impairment. PROBLEM LIST (Impacting functional limitations):  1. Decreased Strength  2. Decreased ADL/Functional Activities  3. Decreased Transfer Abilities  4. Decreased Ambulation Ability/Technique  5. Decreased Balance  6.  Increased Pain  7. Decreased Activity Tolerance  8. Decreased Pacing Skills  9. Decreased Work Simplification/Energy Conservation Techniques  10. Increased Fatigue  11. Decreased Flexibility/Joint Mobility  12. Decreased [place] with Home Exercise Program INTERVENTIONS PLANNED:  1. Balance Exercise  2. Bed Mobility  3. Cold  4. Family Education  5. Gait Training  6. Heat  7. Home Exercise Program (HEP)  8. Manual Therapy  9. Neuromuscular Re-education/Strengthening  10. Range of Motion (ROM)  11. Therapeutic Activites  12. Therapeutic Exercise/Strengthening  13. Transfer Training  14. aquatic therapy   TREATMENT PLAN:  Effective Dates: 1/4/17 TO 4/4/17. Frequency/Duration: 2 times a week for 8 weeks  GOALS: (Goals have been discussed and agreed upon with patient.)  DISCHARGE GOALS: Time Frame: 8 weeks  Patient will be independent with home exercise program within 8 weeks in order to improve independence with management of patient's symptoms and/or functional deficits. Patient will improve their fibromyalgia impact scale score to less than 60 points within 8 weeks in order to improve activity tolerance and pain free independence with activities of daily living and functional mobility. Patient will improve their physical subscale score for modified fatigue impact scale to 28 points within 8 weeks in order to improve activity tolerance. Rehabilitation Potential For Stated Goals: Good  Regarding Anthonycasey Lisandra Landryhoun's therapy, I certify that the treatment plan above will be carried out by a therapist or under their direction. Thank you for this referral,  Daniela Bacon PTA     Referring Physician Signature: Amy Pringle *              Date                    HISTORY:   History of Present Injury/Illness (Reason for Referral):  Ruth Ann Marta notes she has been diagnosed with fibromyalgia since 1992. She notes symptoms are progressively getting worse.  She notes she is finding it harder to push through the symptoms. She requires assistance with functional transfers, bed mobility, and activities of daily living. She does work full time but notes frequent days she misses due to fibromyalgia symptoms. She notes she missed 7 days in December 2016. She reports pain in low back as nerve pain being pulled and tingling pain. She notes pain in posterior legs with straight leg raise and slump test this date. She declined to move low back during movement assessment due to fear of pain. 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 Job requirements include prolonged sitting, computer use, phone use  Katie He requires assistance from  to stand from any surface, bed mobility, get in/out of car, bathe (get in/out of tub), cook, clean, dress at times  Personal Factors:          Sex:  female        Age:  41 y.o. Current Medications: toprimate, trazadone, vitamin D2, diaxapam, flexeril, loritab, ambien   Date Last Reviewed:  2/16/2017   Number of Personal Factors/Comorbidities that affect the Plan of Care: 3+: HIGH COMPLEXITY   EXAMINATION:   Observation/Orthostatic Postural Assessment:  Assessed 1/4/17:  Katie He sits with forward head and rounded shoulders which indicate tight anterior chest musculature, upper trapezius, and levator scapula and weak posterior scapula musculature and deep cervical flexors. She stands with left iliac crest superior to right, left lower extremity longer in supine (unable to assess in long sitting due to pain).  She presents with convex curve to left in thoracic region, convex curve to right in lumbar spine. Right shoulder inferior to left,   Palpation:  Assessed 1/4/17        Gena Flower is tender to palpate whole body throughout evaluation  ROM:  Assessed 1/4/17  Dorsiflexion to neutral with knee extended - limited by pain in posterior thighs and low back          Selective Functional Movement Assessment  Nonfunctional and painful  Cervical flexion, extension, rotation/side bend bilaterally 75% - pain mid back with all motions  appleys scratch test external rotation to T4 bilaterally - pain in forearms  appleys scratch test internal rotation to thoracolumbar region bilaterally - pain in shoulders  Multisegmental flexion and extension - decline to perform due to fear of increased pain  Multisegmental rotation bilaterally - 10% pain L5 region  Single leg stance less than 3 seconds bilaterally - performed independently  Strength:  Assessed 1/4/17        3+/5 all major muscle groups bilateral upper and lower extremities   Special Tests:  Assessed 1/4/17:        Slump test - positive bilaterally  Straight leg raise - positive bilaterally 20 degrees  Neurological Screen: Assessed 1/4/17         Myotomes:  Weak (limited by pain) but symmetrical bilateral lower and upper extremities         Dermatomes:  Tingling noted bilateral lower extremities, feet, hands        Reflexes:   Will assess as able  Functional Mobility: Assessed 1/4/17:        Gait/Ambulation:  Gena Flower ambulates with modified independence, she ambulates with decreased step length bilaterally, decreased heel strike at initial contact, decreased hip extension/internal rotation during terminal stance        Transfers:  Gena Flower required moderate assistance with sit to stand transfer this date, she placed hands on thighs to assist with standing upright        Bed Mobility:  Gena Flower was able to perform bed mobility with modified independence -she used hands to assist lower extremities onto rehab mat - she notes she usually has moderate assistance with bed mobility when at home        Stairs:  Zelalem Fernandez notes she requires moderate assistance with stair negotiation at home  Balance:  Assessed 1/4/17        Tool Used: Timed Up and Go (TUG)  Score:  Initial: 30 seconds    Interpretation of Score: The test measures, in seconds, the time taken by an individual to stand up from a standard arm chair (seat height 46 cm [18 in], arm height 65 cm [25.6 in]), walk a distance of 3 meters (118 in, approx 10 ft), turn, walk back to the chair and sit down. If the individual takes longer than 14 seconds to complete TUG, this indicates risk for falls. Score 7 7.5-10.5 11-14 14.5-17.5 18-21 21.5-24.5 25+   Modifier CH CI CJ CK CL CM CN     Mental Status:  Assessed 1/4/17        Alert and oriented x 4  Vitals: Will assess as able     Body Structures Involved:  1. Nerves  2. Bones  3. Joints  4. Muscles  5. Ligaments Body Functions Affected:  1. Sensory/Pain  2. Neuromusculoskeletal  3. Movement Related  4. Skin Related Activities and Participation Affected:  1. Learning and Applying Knowledge  2. General Tasks and Demands  3. Mobility  4. Self Care  5. Domestic Life  6. Interpersonal Interactions and Relationships  7.  Community, Social and [place] Mobile   Number of elements that affect the Plan of Care: 4+: HIGH COMPLEXITY   CLINICAL PRESENTATION:   Presentation: Evolving clinical presentation with unstable and unpredictable characteristics: HIGH COMPLEXITY   CLINICAL DECISION MAKING:   Outcome Measure: Assessed 1/4/17    Tool Used: Fibromyalgia Impact Questionnaire  Score:  Initial: 72.73    Interpretation of Score: <40 = Mild Fibromyalgia Syndrome   40-60 = Moderate Fibromyalgia Syndrome   >60-70 = Severe Fibromyalgia Syndrome  Score 0 1-18 19-37 38-57 58-77 78-96 97   Modifier CH CI CJ CK CL CM CN     Tool Used: Plascencia Depression Index   Score:  Initial: 14 Interpretation of Score: 0-10 = ups and downs considered normal   11-16= mild mood disturbance   17-20= borderline clinical depression   21-30= moderate depression   31-40= severe depression   Over 40= extreme depression     Tool Used: Modified Fatigue Impact Scale  Score:  Initial: Total score: 42/84  Physical subscale score: 32  Cognitive subscale score: 3  Psychosocial subscale score: 7      Tool Used: 6-MINUTE WALK TEST  Score:  Initial: 400 feet - one standing rest    Interpretation of Score: Normal range varies but is approximately 2733-8917 Feet    Medical Necessity:   · Patient is expected to demonstrate progress in strength, range of motion, balance, coordination and functional technique to decrease assistance required with functional mobility and increase independence with pain free functional mobility. · Patient demonstrates good rehab potential due to higher previous functional level. Reason for Services/Other Comments:  · Patient continues to require skilled intervention due to increased pain and decreased independence with functional mobility. Use of outcome tool(s) and clinical judgement create a POC that gives a: Difficult prediction of patient's progress: HIGH COMPLEXITY   TREATMENT:   (In addition to Assessment/Re-Assessment sessions the following treatments were rendered)    Aquatic Therapy (45 minutes): Aquatic treatment performed per flow grid for Decreased muscle strength, Decreased endurance and Decreased static/dynamic balance and reactive control.        Aquatic Exercise Log       Date  1-10-17 Date  1-17-17 Date  1-26-17 Date  2-7-17 Date  2-14-17   Activity/ Exercise Parameters Parameters Warm up in 5 feet today Parameters Parameters   Walking forward 2 laps 2 laps 2 laps 2 laps 2 laps   Walking backward 2 laps 2 laps 2 laps 2 laps 2 laps   Walking sideways 2 laps 2 laps 2 laps 2 laps 2 laps     Marching 2 laps 2 laps 2 laps 2 laps 2 laps     Goose Step 2 laps 2 laps 2 laps 2 laps 2 laps     Tip toes 2 laps 2 laps 2 laps       Heels          Lunges        Side step squats   Standing on noodle with HHA then performed mini squats x 10     LE Exercises noodle noodle noodle noodle noodle     Hip Flex/Ext Marching x 10 B Marching x 12 B Marching x 15 B Marching x 20 B Marching x 20 B     Hip Abd/Add X 10 B X 12 B X 15 B X 20 B X 20 B     Hip IR/ER          Calf raises          Knee Flex          Squats Knees to chest x 10   2 styles x 10 each       Leg Circles  In 6 feet  X 10 each way each leg  X 10 each way each leg X 10 each way each leg     Step Ups        UE Exercises noodle noodle noodle noodle noodle     Squeeze In X 10 B X 12 B X 15 B X 20 B X 20 B     Push Down X 10 B X 12 B X 15 B X 20 B X 20 B     Pull Down   X 15 B X 20 B X 20 B     Bicep/Tricep        Rows/Press outs   Push ups x 10   Push ups x 20     Chi Positions          Trunk Rotation        Deep H2O/ Noodles noodle noodle noodle noodle noodle     Stabilization          Arms only   Straddle - 2 laps Straddle - 2 laps Straddle - 2 laps     Legs only Under arms- bicycle Both arms and legs  4 laps Arms and legs- 2 laps Straddle - 2 laps  Both - 2 laps Straddle - 2 laps  Both - 2 laps   Cross   [place] 2 x 10  X 20  X 20  2 x 20  2 x 20      Scissors 2 x 10  X 20  X 20   Combo x 20  2 x 20   Combo x 20  2 x 20   Combo x 20      Crab walk 2 laps  2 laps Straddle - 4 laps Straddle - 2 laps   Lower abdominal   work  Knees to chest x 10  2 sequences x 10 each way  2 sequences x 10 each way 2 sequences x 10 each way     Cardio          Jogging     In palce   Lap   Swimming          Stretches []  In Pool  [x]  Whirlpool* []  In Pool  [x]  Whirlpool* []  In Pool  [x]  Whirlpool* []  In Pool  [x]  Whirlpool* []  In Pool  [x]  Whirlpool*     Hamstrings X 3 B X 3 B X 3 B X 3 B X 3 B     Heelcords X 3 B X 3 B X 3 B X 3 B X 3 B     Piriformis X 3 B X 3 B X 3 B X 3 B X 3 B     Neck                  * For increased soft tissue extensibility a warm water (over 100°F) environment was utilized. Supervision/monitoring was provided at all times. Treatment/Session Assessment:  Tamiko Molina reports having more energy after her therapy session than when she came in today. · Pre-treatment Symptoms:  Tamiko Molina notes pain today to be 7/10. She reports feeling tired and fatigued. · Pain: Initial:    7/10. Post Session:  5/10. Less fatigue ·   Compliance with Program/Exercises: Patient reports she is compliant with her HEP. · Recommendations/Intent for next treatment session: \"Next visit will focus on advancements to more challenging activities and reduction in assistance provided\".   Total Treatment Duration: 50 minutes   PT Patient Time In/Time Out  Time In: 1230  Time Out: [address] [place], [place]   2/16/2017

## 2017-02-21 ENCOUNTER — APPOINTMENT (OUTPATIENT)
Dept: PHYSICAL THERAPY | Age: 47
End: 2017-02-21
Attending: FAMILY MEDICINE
Payer: COMMERCIAL

## 2017-02-21 ENCOUNTER — HOSPITAL ENCOUNTER (OUTPATIENT)
Dept: PHYSICAL THERAPY | Age: 47
Discharge: HOME | End: 2017-02-21
Attending: FAMILY MEDICINE
Payer: COMMERCIAL

## 2017-02-22 NOTE — PROGRESS NOTES
Coy Nolasco  : 1970 [facility] at [address]  [phone]   [phone]          17      Patient called to cancel her aquatic therapy appointment due to sick with a stomach bug. Will follow up with the patient at the next visit.   Cheryle Forge, PTA

## 2017-02-23 ENCOUNTER — HOSPITAL ENCOUNTER (OUTPATIENT)
Dept: PHYSICAL THERAPY | Age: 47
Discharge: HOME | End: 2017-02-23
Attending: FAMILY MEDICINE
Payer: COMMERCIAL

## 2017-02-23 PROCEDURE — 97113 AQUATIC THERAPY/EXERCISES: CPT

## 2017-02-23 NOTE — PROGRESS NOTES
Ruth Ann Lozano  : 1970 [facility] at [address], [facility], [address] [facility]  [phone]   [phone]        OUTPATIENT PHYSICAL THERAPY:Daily Note and Aquatic Note 2017    ICD-10: Treatment Diagnosis: fibromyalgia (M79.7)  Difficulty in walking, not elsewhere classified (R26.2)  Low back pain (M54.5)  Precautions/Allergies:   Percocet [oxycodone-acetaminophen] and Morphine   Fall Risk Score: 4 (? 5 = High Risk)  MD Orders: Evaluate and treat MEDICAL/REFERRING DIAGNOSIS:  Fibromyalgia   DATE OF ONSET: 18, diagnosed in , symptoms progressively getting worse  REFERRING PHYSICIAN: David Brightwy: none scheduled at this time   Mary Ann Barrientos noelle 178: Assessed 17  Ms. Sunshine Oliveira presents with increased pain and fatigue with diagnosis of fibromyalgia. She has a diagnosis of herniated disc lumbar and cervical spine. She presents with severe self reported fibromyalgia symptoms, severe self reported physical fatigue, moderate self reported overall fatigue (physical, cognitive, and psychosocial), and self reported mild mood disturbance for Plascencia Depression Index. She presents with postural and gait deviations listed in detail below. She presents with increased risk of falling according to Timed Up and Go assessment. She presents with decreased independence with functional mobility and requires assistance from her  for sit to stand transfers, tub transfers, bed mobility, and activities of daily living. She would benefit from skilled physical therapy in order to improve pain free independence with functional mobility, reduce fall risk and prevent future impairment. PROBLEM LIST (Impacting functional limitations):  1. Decreased Strength  2. Decreased ADL/Functional Activities  3. Decreased Transfer Abilities  4. Decreased Ambulation Ability/Technique  5. Decreased Balance  6.  Increased Pain  7. Decreased Activity Tolerance  8. Decreased Pacing Skills  9. Decreased Work Simplification/Energy Conservation Techniques  10. Increased Fatigue  11. Decreased Flexibility/Joint Mobility  12. Decreased [place] with Home Exercise Program INTERVENTIONS PLANNED:  1. Balance Exercise  2. Bed Mobility  3. Cold  4. Family Education  5. Gait Training  6. Heat  7. Home Exercise Program (HEP)  8. Manual Therapy  9. Neuromuscular Re-education/Strengthening  10. Range of Motion (ROM)  11. Therapeutic Activites  12. Therapeutic Exercise/Strengthening  13. Transfer Training  14. aquatic therapy   TREATMENT PLAN:  Effective Dates: 1/4/17 TO 4/4/17. Frequency/Duration: 2 times a week for 8 weeks  GOALS: (Goals have been discussed and agreed upon with patient.)  DISCHARGE GOALS: Time Frame: 8 weeks  Patient will be independent with home exercise program within 8 weeks in order to improve independence with management of patient's symptoms and/or functional deficits. Patient will improve their fibromyalgia impact scale score to less than 60 points within 8 weeks in order to improve activity tolerance and pain free independence with activities of daily living and functional mobility. Patient will improve their physical subscale score for modified fatigue impact scale to 28 points within 8 weeks in order to improve activity tolerance. Rehabilitation Potential For Stated Goals: Good  Regarding Emerson Shinn's therapy, I certify that the treatment plan above will be carried out by a therapist or under their direction. Thank you for this referral,  Janae Chapman PTA     Referring Physician Signature: Tiffany Gonzalez *              Date                    HISTORY:   History of Present Injury/Illness (Reason for Referral):  Tyalexkamaljit Dubose notes she has been diagnosed with fibromyalgia since 1992. She notes symptoms are progressively getting worse.  She notes she is finding it harder to push through the symptoms. She requires assistance with functional transfers, bed mobility, and activities of daily living. She does work full time but notes frequent days she misses due to fibromyalgia symptoms. She notes she missed 7 days in December 2016. She reports pain in low back as nerve pain being pulled and tingling pain. She notes pain in posterior legs with straight leg raise and slump test this date. She declined to move low back during movement assessment due to fear of pain. 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 Job requirements include prolonged sitting, computer use, phone use  Felicity Mckeon requires assistance from  to stand from any surface, bed mobility, get in/out of car, bathe (get in/out of tub), cook, clean, dress at times  Personal Factors:          Sex:  female        Age:  41 y.o. Current Medications: toprimate, trazadone, vitamin D2, diaxapam, flexeril, loritab, ambien   Date Last Reviewed:  2/23/2017   Number of Personal Factors/Comorbidities that affect the Plan of Care: 3+: HIGH COMPLEXITY   EXAMINATION:   Observation/Orthostatic Postural Assessment:  Assessed 1/4/17:  Felicity Mckeon sits with forward head and rounded shoulders which indicate tight anterior chest musculature, upper trapezius, and levator scapula and weak posterior scapula musculature and deep cervical flexors. She stands with left iliac crest superior to right, left lower extremity longer in supine (unable to assess in long sitting due to pain).  She presents with convex curve to left in thoracic region, convex curve to right in lumbar spine. Right shoulder inferior to left,   Palpation:  Assessed 1/4/17        Demetrius Escobar is tender to palpate whole body throughout evaluation  ROM:  Assessed 1/4/17  Dorsiflexion to neutral with knee extended - limited by pain in posterior thighs and low back          Selective Functional Movement Assessment  Nonfunctional and painful  Cervical flexion, extension, rotation/side bend bilaterally 75% - pain mid back with all motions  appleys scratch test external rotation to T4 bilaterally - pain in forearms  appleys scratch test internal rotation to thoracolumbar region bilaterally - pain in shoulders  Multisegmental flexion and extension - decline to perform due to fear of increased pain  Multisegmental rotation bilaterally - 10% pain L5 region  Single leg stance less than 3 seconds bilaterally - performed independently  Strength:  Assessed 1/4/17        3+/5 all major muscle groups bilateral upper and lower extremities   Special Tests:  Assessed 1/4/17:        Slump test - positive bilaterally  Straight leg raise - positive bilaterally 20 degrees  Neurological Screen: Assessed 1/4/17         Myotomes:  Weak (limited by pain) but symmetrical bilateral lower and upper extremities         Dermatomes:  Tingling noted bilateral lower extremities, feet, hands        Reflexes:   Will assess as able  Functional Mobility: Assessed 1/4/17:        Gait/Ambulation:  Demetrius Escobar ambulates with modified independence, she ambulates with decreased step length bilaterally, decreased heel strike at initial contact, decreased hip extension/internal rotation during terminal stance        Transfers:  Demetrius Escobar required moderate assistance with sit to stand transfer this date, she placed hands on thighs to assist with standing upright        Bed Mobility:  Demetrius Escobar was able to perform bed mobility with modified independence -she used hands to assist lower extremities onto rehab mat - she notes she usually has moderate assistance with bed mobility when at home        Stairs:  Edwin Benito notes she requires moderate assistance with stair negotiation at home  Balance:  Assessed 1/4/17        Tool Used: Timed Up and Go (TUG)  Score:  Initial: 30 seconds    Interpretation of Score: The test measures, in seconds, the time taken by an individual to stand up from a standard arm chair (seat height 46 cm [18 in], arm height 65 cm [25.6 in]), walk a distance of 3 meters (118 in, approx 10 ft), turn, walk back to the chair and sit down. If the individual takes longer than 14 seconds to complete TUG, this indicates risk for falls. Score 7 7.5-10.5 11-14 14.5-17.5 18-21 21.5-24.5 25+   Modifier CH CI CJ CK CL CM CN     Mental Status:  Assessed 1/4/17        Alert and oriented x 4  Vitals: Will assess as able     Body Structures Involved:  1. Nerves  2. Bones  3. Joints  4. Muscles  5. Ligaments Body Functions Affected:  1. Sensory/Pain  2. Neuromusculoskeletal  3. Movement Related  4. Skin Related Activities and Participation Affected:  1. Learning and Applying Knowledge  2. General Tasks and Demands  3. Mobility  4. Self Care  5. Domestic Life  6. Interpersonal Interactions and Relationships  7.  Community, Social and [place]   Number of elements that affect the Plan of Care: 4+: HIGH COMPLEXITY   CLINICAL PRESENTATION:   Presentation: Evolving clinical presentation with unstable and unpredictable characteristics: HIGH COMPLEXITY   CLINICAL DECISION MAKING:   Outcome Measure: Assessed 1/4/17    Tool Used: Fibromyalgia Impact Questionnaire  Score:  Initial: 72.73    Interpretation of Score: <40 = Mild Fibromyalgia Syndrome   40-60 = Moderate Fibromyalgia Syndrome   >60-70 = Severe Fibromyalgia Syndrome  Score 0 1-18 19-37 38-57 58-77 78-96 97   Modifier CH CI CJ CK CL CM CN     Tool Used: Plascencia Depression Index   Score:  Initial: 14 Interpretation of Score: 0-10 = ups and downs considered normal   11-16= mild mood disturbance   17-20= borderline clinical depression   21-30= moderate depression   31-40= severe depression   Over 40= extreme depression     Tool Used: Modified Fatigue Impact Scale  Score:  Initial: Total score: 42/84  Physical subscale score: 32  Cognitive subscale score: 3  Psychosocial subscale score: 7      Tool Used: 6-MINUTE WALK TEST  Score:  Initial: 400 feet - one standing rest    Interpretation of Score: Normal range varies but is approximately 8193-8688 Feet    Medical Necessity:   · Patient is expected to demonstrate progress in strength, range of motion, balance, coordination and functional technique to decrease assistance required with functional mobility and increase independence with pain free functional mobility. · Patient demonstrates good rehab potential due to higher previous functional level. Reason for Services/Other Comments:  · Patient continues to require skilled intervention due to increased pain and decreased independence with functional mobility. Use of outcome tool(s) and clinical judgement create a POC that gives a: Difficult prediction of patient's progress: HIGH COMPLEXITY   TREATMENT:   (In addition to Assessment/Re-Assessment sessions the following treatments were rendered)    Aquatic Therapy (45 minutes): Aquatic treatment performed per flow grid for Decreased muscle strength, Decreased endurance and Decreased static/dynamic balance and reactive control.        Aquatic Exercise Log       Date  1-10-17 Date  1-17-17 Date  1-26-17 Date  2-7-17 Date  2-14-17 2-23-17   Activity/ Exercise Parameters Parameters Warm up in 5 feet today Parameters Parameters    Walking forward 2 laps 2 laps 2 laps 2 laps 2 laps 2 laps   Walking backward 2 laps 2 laps 2 laps 2 laps 2 laps 2 laps   Walking sideways 2 laps 2 laps 2 laps 2 laps 2 laps 2 laps     Marching 2 laps 2 laps 2 laps 2 laps 2 laps 2 laps     [employer] Step 2 laps 2 laps 2 laps 2 laps 2 laps 2 laps     Tip toes 2 laps 2 laps 2 laps        Heels           Lunges         Side step squats   Standing on noodle with HHA then performed mini squats x 10      LE Exercises noodle noodle noodle noodle noodle noodle     Hip Flex/Ext Marching x 10 B Marching x 12 B Marching x 15 B Marching x 20 B Marching x 20 B Marching x 20 B     Hip Abd/Add X 10 B X 12 B X 15 B X 20 B X 20 B X 20 B     Hip IR/ER           Calf raises           Knee Flex           Squats Knees to chest x 10   2 styles x 10 each        Leg Circles  In 6 feet  X 10 each way each leg  X 10 each way each leg X 10 each way each leg X 10 each way each leg     Step Ups         UE Exercises noodle noodle noodle noodle noodle noodle     Squeeze In X 10 B X 12 B X 15 B X 20 B X 20 B X 20 B     Push Down X 10 B X 12 B X 15 B X 20 B X 20 B X 20 B     Pull Down   X 15 B X 20 B X 20 B X 20 B     Bicep/Tricep         Rows/Press outs   Push ups x 10   Push ups x 20      Chi Positions           Trunk Rotation         Deep H2O/ Noodles noodle noodle noodle noodle noodle noodle     Stabilization           Arms only   Straddle - 2 laps Straddle - 2 laps Straddle - 2 laps Straddle - 2 laps     Legs only Under arms- bicycle Both arms and legs  4 laps Arms and legs- 2 laps Straddle - 2 laps  Both - 2 laps Straddle - 2 laps  Both - 2 laps Straddle - 2 laps  Both - 2 laps   Cross   [place] 2 x 10  X 20  X 20  2 x 20  2 x 20  2 x 20      Scissors 2 x 10  X 20  X 20   Combo x 20  2 x 20   Combo x 20  2 x 20   Combo x 20  2 x 20      Crab walk 2 laps  2 laps Straddle - 4 laps Straddle - 2 laps Straddle - 2 laps   Lower abdominal   work  Knees to chest x 10  2 sequences x 10 each way  2 sequences x 10 each way 2 sequences x 10 each way 2 sequences x 10 each way     Cardio           Jogging     In palce    Lap   Swimming           Stretches []  In Pool  [x]  Whirlpool* []  In Pool  [x]  Whirlpool* []  In Pool  [x]  Whirlpool* []  In Pool  [x]  Whirlpool* []  In Pool  [x]  Whirlpool* whirlpool     Hamstrings X 3 B X 3 B X 3 B X 3 B X 3 B X 3 B     Heelcords X 3 B X 3 B X 3 B X 3 B X 3 B X 3 B     Piriformis X 3 B X 3 B X 3 B X 3 B X 3 B X 3 B     Neck                    * For increased soft tissue extensibility a warm water (over 100°F) environment was utilized. Supervision/monitoring was provided at all times. Treatment/Session Assessment:  Jeanine Kramer reports feeling very tired and exhausted today. · Pre-treatment Symptoms:  Jeanine Kramer notes pain today to be 7/10. She reports not having gone to work today due to fatigue. · Pain: Initial:    7/10. Post Session:  5/10. Very tired and exhausted. ·   Compliance with Program/Exercises: Patient reports she is compliant with her HEP. · Recommendations/Intent for next treatment session: \"Next visit will focus on advancements to more challenging activities and reduction in assistance provided\".   Total Treatment Duration: 45 minutes   PT Patient Time In/Time Out  Time In: 1230  Time Out: [place] [address]   2/23/2017

## 2017-02-24 ENCOUNTER — APPOINTMENT (OUTPATIENT)
Dept: PHYSICAL THERAPY | Age: 47
End: 2017-02-24
Attending: FAMILY MEDICINE
Payer: COMMERCIAL

## 2017-02-28 ENCOUNTER — HOSPITAL ENCOUNTER (OUTPATIENT)
Dept: PHYSICAL THERAPY | Age: 47
Discharge: HOME | End: 2017-02-28
Attending: FAMILY MEDICINE
Payer: COMMERCIAL

## 2017-02-28 PROCEDURE — 97113 AQUATIC THERAPY/EXERCISES: CPT

## 2017-02-28 NOTE — PROGRESS NOTES
Leila Bowman  : 1970 [facility] at [address], [facility]  [phone]   [phone]        OUTPATIENT PHYSICAL THERAPY:Daily Note and Aquatic Note 2017    ICD-10: Treatment Diagnosis: fibromyalgia (M79.7)  Difficulty in walking, not elsewhere classified (R26.2)  Low back pain (M54.5)  Precautions/Allergies:   Percocet [oxycodone-acetaminophen] and Morphine   Fall Risk Score: 4 (? 5 = High Risk)  MD Orders: Evaluate and treat MEDICAL/REFERRING DIAGNOSIS:  Fibromyalgia   DATE OF ONSET: 18, diagnosed in , symptoms progressively getting worse  REFERRING PHYSICIAN: [address]: none scheduled at this time   Luiszenia Floyd Roy 178: Assessed 17  Ms. Cheo Almanzar presents with increased pain and fatigue with diagnosis of fibromyalgia. She has a diagnosis of herniated disc lumbar and cervical spine. She presents with severe self reported fibromyalgia symptoms, severe self reported physical fatigue, moderate self reported overall fatigue (physical, cognitive, and psychosocial), and self reported mild mood disturbance for Plascencia Depression Index. She presents with postural and gait deviations listed in detail below. She presents with increased risk of falling according to Timed Up and Go assessment. She presents with decreased independence with functional mobility and requires assistance from her  for sit to stand transfers, tub transfers, bed mobility, and activities of daily living. She would benefit from skilled physical therapy in order to improve pain free independence with functional mobility, reduce fall risk and prevent future impairment. PROBLEM LIST (Impacting functional limitations):  1. Decreased Strength  2. Decreased ADL/Functional Activities  3. Decreased Transfer Abilities  4. Decreased Ambulation Ability/Technique  5. Decreased Balance  6.  Increased Pain  7. Decreased Activity Tolerance  8. Decreased Pacing Skills  9. Decreased Work Simplification/Energy Conservation Techniques  10. Increased Fatigue  11. Decreased Flexibility/Joint Mobility  12. Decreased [place] with Home Exercise Program INTERVENTIONS PLANNED:  1. Balance Exercise  2. Bed Mobility  3. Cold  4. Family Education  5. Gait Training  6. Heat  7. Home Exercise Program (HEP)  8. Manual Therapy  9. Neuromuscular Re-education/Strengthening  10. Range of Motion (ROM)  11. Therapeutic Activites  12. Therapeutic Exercise/Strengthening  13. Transfer Training  14. aquatic therapy   TREATMENT PLAN:  Effective Dates: 1/4/17 TO 4/4/17. Frequency/Duration: 2 times a week for 8 weeks  GOALS: (Goals have been discussed and agreed upon with patient.)  DISCHARGE GOALS: Time Frame: 8 weeks  Patient will be independent with home exercise program within 8 weeks in order to improve independence with management of patient's symptoms and/or functional deficits. Patient will improve their fibromyalgia impact scale score to less than 60 points within 8 weeks in order to improve activity tolerance and pain free independence with activities of daily living and functional mobility. Patient will improve their physical subscale score for modified fatigue impact scale to 28 points within 8 weeks in order to improve activity tolerance. Rehabilitation Potential For Stated Goals: Good  Regarding Alexandro Landryhoun's therapy, I certify that the treatment plan above will be carried out by a therapist or under their direction. Thank you for this referral,  Cheryle Forge, PTA     Referring Physician Signature: Pita Torres *              Date                    HISTORY:   History of Present Injury/Illness (Reason for Referral):  Coy Nolasco notes she has been diagnosed with fibromyalgia since 1992. She notes symptoms are progressively getting worse.  She notes she is finding it harder to push through the symptoms. She requires assistance with functional transfers, bed mobility, and activities of daily living. She does work full time but notes frequent days she misses due to fibromyalgia symptoms. She notes she missed 7 days in December 2016. She reports pain in low back as nerve pain being pulled and tingling pain. She notes pain in posterior legs with straight leg raise and slump test this date. She declined to move low back during movement assessment due to fear of pain. 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 Job requirements include prolonged sitting, computer use, phone use  Leslie Rock requires assistance from  to stand from any surface, bed mobility, get in/out of car, bathe (get in/out of tub), cook, clean, dress at times  Personal Factors:          Sex:  female        Age:  41 y.o. Current Medications: toprimate, trazadone, vitamin D2, diaxapam, flexeril, loritab, ambien   Date Last Reviewed:  2/28/2017   Number of Personal Factors/Comorbidities that affect the Plan of Care: 3+: HIGH COMPLEXITY   EXAMINATION:   Observation/Orthostatic Postural Assessment:  Assessed 1/4/17:  Leslie Rock sits with forward head and rounded shoulders which indicate tight anterior chest musculature, upper trapezius, and levator scapula and weak posterior scapula musculature and deep cervical flexors. She stands with left iliac crest superior to right, left lower extremity longer in supine (unable to assess in long sitting due to pain).  She presents with convex curve to left in thoracic region, convex curve to right in lumbar spine. Right shoulder inferior to left,   Palpation:  Assessed 1/4/17        Charleen Trejo is tender to palpate whole body throughout evaluation  ROM:  Assessed 1/4/17  Dorsiflexion to neutral with knee extended - limited by pain in posterior thighs and low back          Selective Functional Movement Assessment  Nonfunctional and painful  Cervical flexion, extension, rotation/side bend bilaterally 75% - pain mid back with all motions  appleys scratch test external rotation to T4 bilaterally - pain in forearms  appleys scratch test internal rotation to thoracolumbar region bilaterally - pain in shoulders  Multisegmental flexion and extension - decline to perform due to fear of increased pain  Multisegmental rotation bilaterally - 10% pain L5 region  Single leg stance less than 3 seconds bilaterally - performed independently  Strength:  Assessed 1/4/17        3+/5 all major muscle groups bilateral upper and lower extremities   Special Tests:  Assessed 1/4/17:        Slump test - positive bilaterally  Straight leg raise - positive bilaterally 20 degrees  Neurological Screen: Assessed 1/4/17         Myotomes:  Weak (limited by pain) but symmetrical bilateral lower and upper extremities         Dermatomes:  Tingling noted bilateral lower extremities, feet, hands        Reflexes:   Will assess as able  Functional Mobility: Assessed 1/4/17:        Gait/Ambulation:  Charleen Trejo ambulates with modified independence, she ambulates with decreased step length bilaterally, decreased heel strike at initial contact, decreased hip extension/internal rotation during terminal stance        Transfers:  Charleen Trejo required moderate assistance with sit to stand transfer this date, she placed hands on thighs to assist with standing upright        Bed Mobility:  Charleen Trejo was able to perform bed mobility with modified independence -she used hands to assist lower extremities onto rehab mat - she notes she usually has moderate assistance with bed mobility when at home        Stairs:  Luz Garcia notes she requires moderate assistance with stair negotiation at home  Balance:  Assessed 1/4/17        Tool Used: Timed Up and Go (TUG)  Score:  Initial: 30 seconds    Interpretation of Score: The test measures, in seconds, the time taken by an individual to stand up from a standard arm chair (seat height 46 cm [18 in], arm height 65 cm [25.6 in]), walk a distance of 3 meters (118 in, approx 10 ft), turn, walk back to the chair and sit down. If the individual takes longer than 14 seconds to complete TUG, this indicates risk for falls. Score 7 7.5-10.5 11-14 14.5-17.5 18-21 21.5-24.5 25+   Modifier CH CI CJ CK CL CM CN     Mental Status:  Assessed 1/4/17        Alert and oriented x 4  Vitals: Will assess as able     Body Structures Involved:  1. Nerves  2. Bones  3. Joints  4. Muscles  5. Ligaments Body Functions Affected:  1. Sensory/Pain  2. Neuromusculoskeletal  3. Movement Related  4. Skin Related Activities and Participation Affected:  1. Learning and Applying Knowledge  2. General Tasks and Demands  3. Mobility  4. Self Care  5. Domestic Life  6. Interpersonal Interactions and Relationships  7.  Community, Social and [place]   Number of elements that affect the Plan of Care: 4+: HIGH COMPLEXITY   CLINICAL PRESENTATION:   Presentation: Evolving clinical presentation with unstable and unpredictable characteristics: HIGH COMPLEXITY   CLINICAL DECISION MAKING:   Outcome Measure: Assessed 1/4/17    Tool Used: Fibromyalgia Impact Questionnaire  Score:  Initial: 72.73    Interpretation of Score: <40 = Mild Fibromyalgia Syndrome   40-60 = Moderate Fibromyalgia Syndrome   >60-70 = Severe Fibromyalgia Syndrome  Score 0 1-18 19-37 38-57 58-77 78-96 97   Modifier CH CI CJ CK CL CM CN     Tool Used: Plascencia Depression Index   Score:  Initial: 14 Interpretation of Score: 0-10 = ups and downs considered normal   11-16= mild mood disturbance   17-20= borderline clinical depression   21-30= moderate depression   31-40= severe depression   Over 40= extreme depression     Tool Used: Modified Fatigue Impact Scale  Score:  Initial: Total score: 42/84  Physical subscale score: 32  Cognitive subscale score: 3  Psychosocial subscale score: 7      Tool Used: 6-MINUTE WALK TEST  Score:  Initial: 400 feet - one standing rest    Interpretation of Score: Normal range varies but is approximately 2843-5346 Feet    Medical Necessity:   · Patient is expected to demonstrate progress in strength, range of motion, balance, coordination and functional technique to decrease assistance required with functional mobility and increase independence with pain free functional mobility. · Patient demonstrates good rehab potential due to higher previous functional level. Reason for Services/Other Comments:  · Patient continues to require skilled intervention due to increased pain and decreased independence with functional mobility. Use of outcome tool(s) and clinical judgement create a POC that gives a: Difficult prediction of patient's progress: HIGH COMPLEXITY   TREATMENT:   (In addition to Assessment/Re-Assessment sessions the following treatments were rendered)    Aquatic Therapy (45 minutes): Aquatic treatment performed per flow grid for Decreased muscle strength, Decreased endurance and Decreased static/dynamic balance and reactive control.        Aquatic Exercise Log       Date  1-10-17 Date  1-17-17 Date  1-26-17 Date  2-7-17 Date  2-14-17 2-23-17 2-28-17   Activity/ Exercise Parameters Parameters Warm up in 5 feet today Parameters Parameters     Walking forward 2 laps 2 laps 2 laps 2 laps 2 laps 2 laps 2 laps   Walking backward 2 laps 2 laps 2 laps 2 laps 2 laps 2 laps 2 laps   Walking sideways 2 laps 2 laps 2 laps 2 laps 2 laps 2 laps 2 laps     Marching 2 laps 2 laps 2 laps 2 laps 2 laps 2 laps 2 laps     Goose Step 2 laps 2 laps 2 laps 2 laps 2 laps 2 laps 2 laps     Tip toes 2 laps 2 laps 2 laps         Heels            Lunges          Side step squats   Standing on noodle with HHA then performed mini squats x 10       LE Exercises noodle noodle noodle noodle noodle noodle noodle     Hip Flex/Ext Marching x 10 B Marching x 12 B Marching x 15 B Marching x 20 B Marching x 20 B Marching x 20 B Marching x 20 B     Hip Abd/Add X 10 B X 12 B X 15 B X 20 B X 20 B X 20 B X 20 B     Hip IR/ER            Calf raises            Knee Flex            Squats Knees to chest x 10   2 styles x 10 each         Leg Circles  In 6 feet  X 10 each way each leg  X 10 each way each leg X 10 each way each leg X 10 each way each leg Deep end x 10 each way each leg     Step Ups          UE Exercises noodle noodle noodle noodle noodle noodle      Squeeze In X 10 B X 12 B X 15 B X 20 B X 20 B X 20 B      Push Down X 10 B X 12 B X 15 B X 20 B X 20 B X 20 B      Pull Down   X 15 B X 20 B X 20 B X 20 B      Bicep/Tricep          Rows/Press outs   Push ups x 10   Push ups x 20   Push ups x 20     Chi Positions            Trunk Rotation          Deep H2O/ Noodles noodle noodle noodle noodle noodle noodle noodle     Stabilization            Arms only   Straddle - 2 laps Straddle - 2 laps Straddle - 2 laps Straddle - 2 laps Straddle - 2 laps     Legs only Under arms- bicycle Both arms and legs  4 laps Arms and legs- 2 laps Straddle - 2 laps  Both - 2 laps Straddle - 2 laps  Both - 2 laps Straddle - 2 laps  Both - 2 laps Straddle - 2 laps  Both - 4 laps   Cross   [place] 2 x 10  X 20  X 20  2 x 20  2 x 20  2 x 20  5 min     Scissors 2 x 10  X 20  X 20   Combo x 20  2 x 20   Combo x 20  2 x 20   Combo x 20  2 x 20  5 min     Crab walk 2 laps  2 laps Straddle - 4 laps Straddle - 2 laps Straddle - 2 laps 4 laps   Lower abdominal   work  Knees to chest x 10  2 sequences x 10 each way  2 sequences x 10 each way 2 sequences x 10 each way 2 sequences x 10 each way 2 sequences x 15 each way     Cardio            Jogging     In palce  In deep end     Lap   Swimming            Stretches []  In Pool  [x]  Whirlpool* []  In Pool  [x]  Whirlpool* []  In Pool  [x]  Whirlpool* []  In Pool  [x]  Whirlpool* []  In Pool  [x]  Whirlpool* whirlpool whirlpool     Hamstrings X 3 B X 3 B X 3 B X 3 B X 3 B X 3 B X 3 B     Heelcords X 3 B X 3 B X 3 B X 3 B X 3 B X 3 B X 3 B     Piriformis X 3 B X 3 B X 3 B X 3 B X 3 B X 3 B X 3 B     Neck                      * For increased soft tissue extensibility a warm water (over 100°F) environment was utilized. Supervision/monitoring was provided at all times. Treatment/Session Assessment:  Taran Bowles reports exhaustion from last week is improved. · Pre-treatment Symptoms:  Taran Bowles notes pain today to be 6/10. · Pain: Initial:    6/10. Post Session: 4/10. She states heat along with stretching today is less painful. ·   Compliance with Program/Exercises: Patient reports she is compliant with her HEP. More deep end activities due to patients height and she can not cheat by pushing of the bottom to help stabilize. · Recommendations/Intent for next treatment session: \"Next visit will focus on advancements to more challenging activities and reduction in assistance provided\".   Total Treatment Duration: 45 minutes   PT Patient Time In/Time Out  Time In: 1230  Time Out: Marbella Balbuena. [address] [place] [address]   2/28/2017

## 2017-03-02 ENCOUNTER — HOSPITAL ENCOUNTER (OUTPATIENT)
Dept: PHYSICAL THERAPY | Age: 47
Discharge: HOME | End: 2017-03-02
Attending: FAMILY MEDICINE
Payer: COMMERCIAL

## 2017-03-02 PROCEDURE — 97113 AQUATIC THERAPY/EXERCISES: CPT

## 2017-03-02 NOTE — PROGRESS NOTES
Cesar Casarez  : 1970 [facility] at [address], [address] [facility]  [phone]   [phone]        OUTPATIENT PHYSICAL THERAPY:Daily Note and Aquatic Note 3/2/2017    ICD-10: Treatment Diagnosis: fibromyalgia (M79.7)  Difficulty in walking, not elsewhere classified (R26.2)  Low back pain (M54.5)  Precautions/Allergies:   Percocet [oxycodone-acetaminophen] and Morphine   Fall Risk Score: 4 (? 5 = High Risk)  MD Orders: Evaluate and treat MEDICAL/REFERRING DIAGNOSIS:  Fibromyalgia   DATE OF ONSET: 18, diagnosed in , symptoms progressively getting worse  REFERRING PHYSICIAN: [address]: none scheduled at this time   Luiszenia Barrientos Manuela 178: Assessed 17  Ms. Mk Tanner presents with increased pain and fatigue with diagnosis of fibromyalgia. She has a diagnosis of herniated disc lumbar and cervical spine. She presents with severe self reported fibromyalgia symptoms, severe self reported physical fatigue, moderate self reported overall fatigue (physical, cognitive, and psychosocial), and self reported mild mood disturbance for Plascencia Depression Index. She presents with postural and gait deviations listed in detail below. She presents with increased risk of falling according to Timed Up and Go assessment. She presents with decreased independence with functional mobility and requires assistance from her  for sit to stand transfers, tub transfers, bed mobility, and activities of daily living. She would benefit from skilled physical therapy in order to improve pain free independence with functional mobility, reduce fall risk and prevent future impairment. PROBLEM LIST (Impacting functional limitations):  1. Decreased Strength  2. Decreased ADL/Functional Activities  3. Decreased Transfer Abilities  4. Decreased Ambulation Ability/Technique  5. Decreased Balance  6.  Increased Pain  7. Decreased Activity Tolerance  8. Decreased Pacing Skills  9. Decreased Work Simplification/Energy Conservation Techniques  10. Increased Fatigue  11. Decreased Flexibility/Joint Mobility  12. Decreased [place] with Home Exercise Program INTERVENTIONS PLANNED:  1. Balance Exercise  2. Bed Mobility  3. Cold  4. Family Education  5. Gait Training  6. Heat  7. Home Exercise Program (HEP)  8. Manual Therapy  9. Neuromuscular Re-education/Strengthening  10. Range of Motion (ROM)  11. Therapeutic Activites  12. Therapeutic Exercise/Strengthening  13. Transfer Training  14. aquatic therapy   TREATMENT PLAN:  Effective Dates: 1/4/17 TO 4/4/17. Frequency/Duration: 2 times a week for 8 weeks  GOALS: (Goals have been discussed and agreed upon with patient.)  DISCHARGE GOALS: Time Frame: 8 weeks  Patient will be independent with home exercise program within 8 weeks in order to improve independence with management of patient's symptoms and/or functional deficits. Patient will improve their fibromyalgia impact scale score to less than 60 points within 8 weeks in order to improve activity tolerance and pain free independence with activities of daily living and functional mobility. Patient will improve their physical subscale score for modified fatigue impact scale to 28 points within 8 weeks in order to improve activity tolerance. Rehabilitation Potential For Stated Goals: Good  Regarding Winn Scheuermann Calhoun's therapy, I certify that the treatment plan above will be carried out by a therapist or under their direction. Thank you for this referral,  Alonso Madera PTA     Referring Physician Signature: Angel White *              Date                    HISTORY:   History of Present Injury/Illness (Reason for Referral):  Eduardo Campos notes she has been diagnosed with fibromyalgia since 1992. She notes symptoms are progressively getting worse.  She notes she is finding it harder to push through the symptoms. She requires assistance with functional transfers, bed mobility, and activities of daily living. She does work full time but notes frequent days she misses due to fibromyalgia symptoms. She notes she missed 7 days in December 2016. She reports pain in low back as nerve pain being pulled and tingling pain. She notes pain in posterior legs with straight leg raise and slump test this date. She declined to move low back during movement assessment due to fear of pain. 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 Job requirements include prolonged sitting, computer use, phone use  Dallas Diggs requires assistance from  to stand from any surface, bed mobility, get in/out of car, bathe (get in/out of tub), cook, clean, dress at times  Personal Factors:          Sex:  female        Age:  41 y.o. Current Medications: toprimate, trazadone, vitamin D2, diaxapam, flexeril, loritab, ambien   Date Last Reviewed:  3/2/2017   Number of Personal Factors/Comorbidities that affect the Plan of Care: 3+: HIGH COMPLEXITY   EXAMINATION:   Observation/Orthostatic Postural Assessment:  Assessed 1/4/17:  Dallas Diggs sits with forward head and rounded shoulders which indicate tight anterior chest musculature, upper trapezius, and levator scapula and weak posterior scapula musculature and deep cervical flexors. She stands with left iliac crest superior to right, left lower extremity longer in supine (unable to assess in long sitting due to pain).  She presents with convex curve to left in thoracic region, convex curve to right in lumbar spine. Right shoulder inferior to left,   Palpation:  Assessed 1/4/17        Alisa Lou is tender to palpate whole body throughout evaluation  ROM:  Assessed 1/4/17  Dorsiflexion to neutral with knee extended - limited by pain in posterior thighs and low back          Selective Functional Movement Assessment  Nonfunctional and painful  Cervical flexion, extension, rotation/side bend bilaterally 75% - pain mid back with all motions  appleys scratch test external rotation to T4 bilaterally - pain in forearms  appleys scratch test internal rotation to thoracolumbar region bilaterally - pain in shoulders  Multisegmental flexion and extension - decline to perform due to fear of increased pain  Multisegmental rotation bilaterally - 10% pain L5 region  Single leg stance less than 3 seconds bilaterally - performed independently  Strength:  Assessed 1/4/17        3+/5 all major muscle groups bilateral upper and lower extremities   Special Tests:  Assessed 1/4/17:        Slump test - positive bilaterally  Straight leg raise - positive bilaterally 20 degrees  Neurological Screen: Assessed 1/4/17         Myotomes:  Weak (limited by pain) but symmetrical bilateral lower and upper extremities         Dermatomes:  Tingling noted bilateral lower extremities, feet, hands        Reflexes:   Will assess as able  Functional Mobility: Assessed 1/4/17:        Gait/Ambulation:  Alisa Lou ambulates with modified independence, she ambulates with decreased step length bilaterally, decreased heel strike at initial contact, decreased hip extension/internal rotation during terminal stance        Transfers:  Alisa Lou required moderate assistance with sit to stand transfer this date, she placed hands on thighs to assist with standing upright        Bed Mobility:  Alisa Lou was able to perform bed mobility with modified independence -she used hands to assist lower extremities onto rehab mat - she notes she usually has moderate assistance with bed mobility when at home        Stairs:  Leila Finemigdio notes she requires moderate assistance with stair negotiation at home  Balance:  Assessed 1/4/17        Tool Used: Timed Up and Go (TUG)  Score:  Initial: 30 seconds    Interpretation of Score: The test measures, in seconds, the time taken by an individual to stand up from a standard arm chair (seat height 46 cm [18 in], arm height 65 cm [25.6 in]), walk a distance of 3 meters (118 in, approx 10 ft), turn, walk back to the chair and sit down. If the individual takes longer than 14 seconds to complete TUG, this indicates risk for falls. Score 7 7.5-10.5 11-14 14.5-17.5 18-21 21.5-24.5 25+   Modifier CH CI CJ CK CL CM CN     Mental Status:  Assessed 1/4/17        Alert and oriented x 4  Vitals: Will assess as able     Body Structures Involved:  1. Nerves  2. Bones  3. Joints  4. Muscles  5. Ligaments Body Functions Affected:  1. Sensory/Pain  2. Neuromusculoskeletal  3. Movement Related  4. Skin Related Activities and Participation Affected:  1. Learning and Applying Knowledge  2. General Tasks and Demands  3. Mobility  4. Self Care  5. Domestic Life  6. Interpersonal Interactions and Relationships  7.  Community, Social and [place]   Number of elements that affect the Plan of Care: 4+: HIGH COMPLEXITY   CLINICAL PRESENTATION:   Presentation: Evolving clinical presentation with unstable and unpredictable characteristics: HIGH COMPLEXITY   CLINICAL DECISION MAKING:   Outcome Measure: Assessed 1/4/17    Tool Used: Fibromyalgia Impact Questionnaire  Score:  Initial: 72.73    Interpretation of Score: <40 = Mild Fibromyalgia Syndrome   40-60 = Moderate Fibromyalgia Syndrome   >60-70 = Severe Fibromyalgia Syndrome  Score 0 1-18 19-37 38-57 58-77 78-96 97   Modifier CH CI CJ CK CL CM CN     Tool Used: Plascencia Depression Index   Score:  Initial: 14 Interpretation of Score: 0-10 = ups and downs considered normal   11-16= mild mood disturbance   17-20= borderline clinical depression   21-30= moderate depression   31-40= severe depression   Over 40= extreme depression     Tool Used: Modified Fatigue Impact Scale  Score:  Initial: Total score: 42/84  Physical subscale score: 32  Cognitive subscale score: 3  Psychosocial subscale score: 7      Tool Used: 6-MINUTE WALK TEST  Score:  Initial: 400 feet - one standing rest    Interpretation of Score: Normal range varies but is approximately 7885-3466 Feet    Medical Necessity:   · Patient is expected to demonstrate progress in strength, range of motion, balance, coordination and functional technique to decrease assistance required with functional mobility and increase independence with pain free functional mobility. · Patient demonstrates good rehab potential due to higher previous functional level. Reason for Services/Other Comments:  · Patient continues to require skilled intervention due to increased pain and decreased independence with functional mobility. Use of outcome tool(s) and clinical judgement create a POC that gives a: Difficult prediction of patient's progress: HIGH COMPLEXITY   TREATMENT:   (In addition to Assessment/Re-Assessment sessions the following treatments were rendered)    Aquatic Therapy (45 minutes): Aquatic treatment performed per flow grid for Decreased muscle strength, Decreased endurance and Decreased static/dynamic balance and reactive control.        Aquatic Exercise Log       Date  1-10-17 Date  1-17-17 Date  1-26-17 Date  2-7-17 Date  2-14-17 2-23-17 2-28-17 3-2-17   Activity/ Exercise Parameters Parameters Warm up in 5 feet today Parameters Parameters   In 5 feet    Walking forward 2 laps 2 laps 2 laps 2 laps 2 laps 2 laps 2 laps 2 laps   Walking backward 2 laps 2 laps 2 laps 2 laps 2 laps 2 laps 2 laps 2 laps   Walking sideways 2 laps 2 laps 2 laps 2 laps 2 laps 2 laps 2 laps 2 laps     Marching 2 laps 2 laps 2 laps 2 laps 2 laps 2 laps 2 laps 2 laps     Goose Step 2 laps 2 laps 2 laps 2 laps 2 laps 2 laps 2 laps 2 laps     Tip toes 2 laps 2 laps 2 laps          Heels             Lunges           Side step squats   Standing on noodle with HHA then performed mini squats x 10        LE Exercises noodle noodle noodle noodle noodle noodle noodle noodle     Hip Flex/Ext Marching x 10 B Marching x 12 B Marching x 15 B Marching x 20 B Marching x 20 B Marching x 20 B Marching x 20 B Marching x 20 B     Hip Abd/Add X 10 B X 12 B X 15 B X 20 B X 20 B X 20 B X 20 B X 20 B     Hip IR/ER             Calf raises             Knee Flex             Squats Knees to chest x 10   2 styles x 10 each          Leg Circles  In 6 feet  X 10 each way each leg  X 10 each way each leg X 10 each way each leg X 10 each way each leg Deep end x 10 each way each leg Deep end x 10 each way each leg     Step Ups           UE Exercises noodle noodle noodle noodle noodle noodle  noodle     Squeeze In X 10 B X 12 B X 15 B X 20 B X 20 B X 20 B  X 20 B     Push Down X 10 B X 12 B X 15 B X 20 B X 20 B X 20 B  X 20 B     Pull Down   X 15 B X 20 B X 20 B X 20 B  X 20 B     Bicep/Tricep           Rows/Press outs   Push ups x 10   Push ups x 20   Push ups x 20  Push ups x 20     Chi Positions             Trunk Rotation           Deep H2O/ Noodles noodle noodle noodle noodle noodle noodle noodle noodle     Stabilization             Arms only   Straddle - 2 laps Straddle - 2 laps Straddle - 2 laps Straddle - 2 laps Straddle - 2 laps Straddle - 2 laps     Legs only Under arms- bicycle Both arms and legs  4 laps Arms and legs- 2 laps Straddle - 2 laps  Both - 2 laps Straddle - 2 laps  Both - 2 laps Straddle - 2 laps  Both - 2 laps Straddle - 2 laps  Both - 4 laps Both - straddle - 4 laps   Cross   [place] 2 x 10  X 20  X 20  2 x 20  2 x 20  2 x 20  5 min 2 x 25     Scissors 2 x 10  X 20  X 20   Combo x 20  2 x 20   Combo x 20  2 x 20 Combo x 20  2 x 20  5 min 2 x 25     Crab walk 2 laps  2 laps Straddle - 4 laps Straddle - 2 laps Straddle - 2 laps 4 laps 4 laps   Lower abdominal   work  Knees to chest x 10  2 sequences x 10 each way  2 sequences x 10 each way 2 sequences x 10 each way 2 sequences x 10 each way 2 sequences x 15 each way 2 sequences x 10 each way     Cardio             Jogging     In palce  In deep end   In deep    Lap   Swimming             Stretches []  In Pool  [x]  Whirlpool* []  In Pool  [x]  Whirlpool* []  In Pool  [x]  Whirlpool* []  In Pool  [x]  Whirlpool* []  In Pool  [x]  Whirlpool* whirlpool whirlpool whirlpool     Hamstrings X 3 B X 3 B X 3 B X 3 B X 3 B X 3 B X 3 B X 3 B     Heelcords X 3 B X 3 B X 3 B X 3 B X 3 B X 3 B X 3 B X 3 B     Piriformis X 3 B X 3 B X 3 B X 3 B X 3 B X 3 B X 3 B X 3 B     Neck                        * For increased soft tissue extensibility a warm water (over 100°F) environment was utilized. Supervision/monitoring was provided at all times. Treatment/Session Assessment:  Joann Romero reports fatigue and pain are improving with medicine changes and therapy. · Pre-treatment Symptoms:  Joann Romero notes pain today to be 6/10. · Pain: Initial:    6/10. Post Session: 5/10. ·   Compliance with Program/Exercises: Patient reports she is compliant with her HEP. Deep end activities improving. No other major complaints. · Recommendations/Intent for next treatment session: \"Next visit will focus on advancements to more challenging activities and reduction in assistance provided\".   Total Treatment Duration: 45 minutes   PT Patient Time In/Time Out  Time In: 1230  Time Out: Marbella Balbuena. [address] [place] [address]   3/2/2017

## 2017-03-06 ENCOUNTER — HOSPITAL ENCOUNTER (OUTPATIENT)
Dept: PHYSICAL THERAPY | Age: 47
Discharge: HOME | End: 2017-03-06
Attending: FAMILY MEDICINE
Payer: COMMERCIAL

## 2017-03-06 NOTE — PROGRESS NOTES
Amado Browning  : 1970 [facility] at [address]  [phone]   [phone]        OUTPATIENT DAILY NOTE    NAME/AGE/GENDER: Amado Browning is a 41 y.o. female. DATE: 3/6/2017    Patient canceled physical therapy appointment today and rescheduled for later this week. Will plan to follow up on next scheduled visit.     Wanda Dunham, PT

## 2017-03-08 ENCOUNTER — HOSPITAL ENCOUNTER (OUTPATIENT)
Dept: PHYSICAL THERAPY | Age: 47
Discharge: HOME | End: 2017-03-08
Attending: FAMILY MEDICINE
Payer: COMMERCIAL

## 2017-03-08 PROCEDURE — 97110 THERAPEUTIC EXERCISES: CPT

## 2017-03-08 NOTE — PROGRESS NOTES
Demetrius Escobar  : 1970 [facility] [address]  [phone]   [phone]        OUTPATIENT PHYSICAL THERAPY:Discontinuation Summary 3/9/2017    ICD-10: Treatment Diagnosis: fibromyalgia (M79.7)  Difficulty in walking, not elsewhere classified (R26.2)  Low back pain (M54.5)  Precautions/Allergies:   Percocet [oxycodone-acetaminophen] and Morphine   Fall Risk Score: 4 (? 5 = High Risk)  MD Orders: Evaluate and treat MEDICAL/REFERRING DIAGNOSIS:  Fibromyalgia   DATE OF ONSET: 18, diagnosed in , symptoms progressively getting worse  REFERRING PHYSICIAN: [address]: none scheduled at this time   Demetrius Escobar has attended 16 physical therapy sessions including initial evaluation. INITIAL ASSESSMENT:   Ms. Candace Harding to be discontinued from physical therapy due to not showing since 17. PROBLEM LIST (Impacting functional limitations):  1. Decreased Strength  2. Decreased ADL/Functional Activities  3. Decreased Transfer Abilities  4. Decreased Ambulation Ability/Technique  5. Decreased Balance  6. Increased Pain  7. Decreased Activity Tolerance  8. Decreased Pacing Skills  9. Decreased Work Simplification/Energy Conservation Techniques  10. Increased Fatigue  11. Decreased Flexibility/Joint Mobility  12. Decreased [place] with Home Exercise Program INTERVENTIONS PLANNED:  1. Balance Exercise  2. Bed Mobility  3. Cold  4. Family Education  5. Gait Training  6. Heat  7. Home Exercise Program (HEP)  8. Manual Therapy  9. Neuromuscular Re-education/Strengthening  10. Range of Motion (ROM)  11. Therapeutic Activites  12. Therapeutic Exercise/Strengthening  13.  Transfer Training  14. aquatic therapy   TREATMENT PLAN:  Effective Dates: 3/8/17 to 17    Frequency/Duration:   GOALS: (Goals have been discussed and agreed upon with patient.)  DISCHARGE GOALS: Time Frame: 8 weeks  Patient will be independent with home exercise program within 8 weeks in order to improve independence with management of patient's symptoms and/or functional deficits. (CONTINUE 2/10/17) (CONTINUE 3/8/17) (NOT MET due to NOT ASSESSED 6/9/17)  Patient will improve their fibromyalgia impact scale score to less than 60 points within 8 weeks in order to improve activity tolerance and pain free independence with activities of daily living and functional mobility. (CONTINUE 2/10/17) (MET 3/8/17) NEW LTG 2: Patient will improve their fibromyalgia scale score to less than 50 points within 6 weeks in order to improve activity tolerance and pain free independence with activities of daily living and functional mobility (START 3/8/17) (NOT MET due to NOT ASSESSED 6/9/17)  Patient will improve their physical subscale score for modified fatigue impact scale to 28 points within 8 weeks in order to improve activity tolerance. (CONTINUE 2/10/17) (MET 3/8/17) NEW LTG 3: Patient will improve their physical subscale score for modified fatigue impact scale to less than 20 points within 6 weeks in order to improve activity tolerance (START 3/8/17) (NOT MET due to NOT ASSESSED 6/9/17)  Rehabilitation Potential For Stated Goals: Good              HISTORY:   History of Present Injury/Illness (Reason for Referral):  Cesar Cedar notes she has been diagnosed with fibromyalgia since 1992. She notes symptoms are progressively getting worse. She notes she is finding it harder to push through the symptoms. She requires assistance with functional transfers, bed mobility, and activities of daily living. She does work full time but notes frequent days she misses due to fibromyalgia symptoms. She notes she missed 7 days in December 2016. She reports pain in low back as nerve pain being pulled and tingling pain. She notes pain in posterior legs with straight leg raise and slump test this date.  She declined to move low back during movement assessment due to fear of pain. 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 Job requirements include prolonged sitting, computer use, phone use  Francisca Askew requires assistance from  to stand from any surface, bed mobility, get in/out of car, bathe (get in/out of tub), cook, clean, dress at times  Personal Factors:          Sex:  female        Age:  41 y.o. Current Medications: toprimate, trazadone, vitamin D2, diaxapam, flexeril, loritab, ambien   Date Last Reviewed:  3/9/2017   Number of Personal Factors/Comorbidities that affect the Plan of Care: 3+: HIGH COMPLEXITY   EXAMINATION:   Observation/Orthostatic Postural Assessment:  Assessed 1/4/17:    Francisca Askew sits with forward head and rounded shoulders which indicate tight anterior chest musculature, upper trapezius, and levator scapula and weak posterior scapula musculature and deep cervical flexors. She stands with left iliac crest superior to right, left lower extremity longer in supine (unable to assess in long sitting due to pain). She presents with convex curve to left in thoracic region, convex curve to right in lumbar spine.  Right shoulder inferior to left,   Palpation:  Assessed 1/4/17        Francisca Askew is tender to palpate whole body throughout evaluation  ROM:  Assessed 1/4/17  Dorsiflexion to neutral with knee extended - limited by pain in posterior thighs and low back          Selective Functional Movement Assessment  Nonfunctional and painful  Cervical flexion, extension, rotation/side bend bilaterally 75% - pain mid back with all motions  appleys scratch test external rotation to T4 bilaterally - pain in forearms  appleys scratch test internal rotation to thoracolumbar region bilaterally - pain in shoulders  Multisegmental flexion and extension - decline to perform due to fear of increased pain  Multisegmental rotation bilaterally - 10% pain L5 region  Single leg stance less than 3 seconds bilaterally - performed independently  Strength:  Assessed 3/8/17        4/5 all major muscle groups bilateral upper and lower extremities   Special Tests:  Assessed 1/4/17:        Slump test - positive bilaterally  Straight leg raise - positive bilaterally 20 degrees  Neurological Screen: Assessed 3/8/17         Myotomes:  Strong bilateral upper and lower extremities         Dermatomes:  Tingling noted bilateral lower extremities, feet, hands        Reflexes: Will assess as able  Functional Mobility: Assessed 3/8/17:        Gait/Ambulation:  Madiha Paredes ambulates with modified independence, she ambulates with improved step length and gait speed        Transfers:  Madiha Paredes is modified independent with functional transfers        Bed Mobility:  Madiha Paredes is modified independent with bed mobility        Stairs:  Madiha Paredes is modified independent with stairs  Balance:  Assessed 3/8/17        Tool Used: Timed Up and Go (TUG)  Score:  Initial: 30 seconds 17.39 seconds 9 seconds   Interpretation of Score: The test measures, in seconds, the time taken by an individual to stand up from a standard arm chair (seat height 46 cm [18 in], arm height 65 cm [25.6 in]), walk a distance of 3 meters (118 in, approx 10 ft), turn, walk back to the chair and sit down. If the individual takes longer than 14 seconds to complete TUG, this indicates risk for falls.   Score 7 7.5-10.5 11-14 14.5-17.5 18-21 21.5-24.5 25+   Modifier CH CI CJ CK CL CM CN     Mental Status:  Assessed 1/4/17        Alert and oriented x 4  Vitals: Will assess as able     Body Structures Involved:  1. Nerves  2. Bones  3. Joints  4. Muscles  5. Ligaments Body Functions Affected:  1. Sensory/Pain  2. Neuromusculoskeletal  3. Movement Related  4. Skin Related Activities and Participation Affected:  1. Learning and Applying Knowledge  2. General Tasks and Demands  3. Mobility  4. Self Care  5. Domestic Life  6. Interpersonal Interactions and Relationships  7.  Community, Social and [place]   Number of elements that affect the Plan of Care: 4+: HIGH COMPLEXITY   CLINICAL PRESENTATION:   Presentation: Evolving clinical presentation with unstable and unpredictable characteristics: HIGH COMPLEXITY   CLINICAL DECISION MAKING:   Outcome Measure: Assessed 3/8/17    Tool Used: Fibromyalgia Impact Questionnaire  Score:  Initial: 72.73 68.88 51.265   Interpretation of Score: <40 = Mild Fibromyalgia Syndrome   40-60 = Moderate Fibromyalgia Syndrome   >60-70 = Severe Fibromyalgia Syndrome  Score 0 1-18 19-37 38-57 58-77 78-96 97   Modifier CH CI CJ CK CL CM CN     Tool Used: [place] Depression Index   Score:  Initial: 14 10 7   Interpretation of Score: 0-10 = ups and downs considered normal   11-16= mild mood disturbance   17-20= borderline clinical depression   21-30= moderate depression   31-40= severe depression   Over 40= extreme depression     Tool Used: Modified Fatigue Impact Scale  Score:  Initial: Total score: 42/84  Physical subscale score: 32  Cognitive subscale score: 3  Psychosocial subscale score: 7 Total score: 42/84  Physical subscale score: 32  Cognitive subscale score: 6  psychosocial subscale score: 4 Total score: 25/84  Physical subscale score: 22  Cognitive subscale score: 0  Psychosocial subscale score: 3     Tool Used: 6-MINUTE WALK TEST  Score:  Initial: 400 feet - one standing rest 800 feet - no rests  1375 feet   Interpretation of Score: Normal range varies but is approximately 9031-1391 Feet       Use of outcome tool(s) and clinical judgement create a POC that gives a: Difficult prediction of patient's progress: HIGH COMPLEXITY   TREATMENT:     Danna Downs, PT

## 2017-03-28 ENCOUNTER — HOSPITAL ENCOUNTER (OUTPATIENT)
Dept: PHYSICAL THERAPY | Age: 47
Discharge: HOME | End: 2017-03-28
Attending: FAMILY MEDICINE
Payer: COMMERCIAL

## 2017-03-28 NOTE — PROGRESS NOTES
Keren Herrera  : 1970 [facility] [address], [address] [place] [address]  [phone]   [phone]          3/28/2017       Patient called to cancel her aquatic therapy session due to being too tired from a trip she just returned from late last night. Will follow up with the patient at the next visit.   Marian Carl, PTA

## 2017-04-04 ENCOUNTER — HOSPITAL ENCOUNTER (OUTPATIENT)
Dept: PHYSICAL THERAPY | Age: 47
Discharge: HOME | End: 2017-04-04
Attending: FAMILY MEDICINE
Payer: COMMERCIAL

## 2017-04-04 PROCEDURE — 97113 AQUATIC THERAPY/EXERCISES: CPT

## 2017-04-04 NOTE — PROGRESS NOTES
Femi Camilo  : 1970 [facility] [place] [address] [address] [facility]  [phone]   [phone]        OUTPATIENT PHYSICAL THERAPY:Daily Note and Aquatic Note 2017    ICD-10: Treatment Diagnosis: fibromyalgia (M79.7)  Difficulty in walking, not elsewhere classified (R26.2)  Low back pain (M54.5)  Precautions/Allergies:   Percocet [oxycodone-acetaminophen] and Morphine   Fall Risk Score: 4 (? 5 = High Risk)  MD Orders: Evaluate and treat MEDICAL/REFERRING DIAGNOSIS:  Fibromyalgia   DATE OF ONSET: 18, diagnosed in , symptoms progressively getting worse  REFERRING PHYSICIAN: [address]: none scheduled at this time   Femi Camilo has attended 13 physical therapy sessions including initial evaluation. INITIAL ASSESSMENT: 3/8/17   Ms. Nasim Avila presents with moderate self reported fibromyalgia symptoms, moderate self reported physical fatigue, minimal self reported overall fatigue (physical, cognitive, and psychosocial), and self reported normal ups and downs for Plascencia Depression Index. She presents with postural and gait deviations listed in detail below. She presents with improved timed up and go assessment score with decreased risk of falling. She presents with improved independence with functional mobility this date. She would benefit from skilled physical therapy in order to improve pain free independence with functional mobility, reduce fall risk and prevent future impairment. PROBLEM LIST (Impacting functional limitations):  1. Decreased Strength  2. Decreased ADL/Functional Activities  3. Decreased Transfer Abilities  4. Decreased Ambulation Ability/Technique  5. Decreased Balance  6. Increased Pain  7. Decreased Activity Tolerance  8. Decreased Pacing Skills  9. Decreased Work Simplification/Energy Conservation Techniques  10. Increased Fatigue  11.  Decreased Flexibility/Joint Mobility  12. Decreased [place] with Home Exercise Program INTERVENTIONS PLANNED:  1. Balance Exercise  2. Bed Mobility  3. Cold  4. Family Education  5. Gait Training  6. Heat  7. Home Exercise Program (HEP)  8. Manual Therapy  9. Neuromuscular Re-education/Strengthening  10. Range of Motion (ROM)  11. Therapeutic Activites  12. Therapeutic Exercise/Strengthening  13. Transfer Training  14. aquatic therapy   TREATMENT PLAN:  Effective Dates: 3/8/17 to 6/8/17    Frequency/Duration: 2 times a week for 8 weeks  GOALS: (Goals have been discussed and agreed upon with patient.)  DISCHARGE GOALS: Time Frame: 8 weeks  Patient will be independent with home exercise program within 8 weeks in order to improve independence with management of patient's symptoms and/or functional deficits. (CONTINUE 2/10/17) (CONTINUE 3/8/17)  Patient will improve their fibromyalgia impact scale score to less than 60 points within 8 weeks in order to improve activity tolerance and pain free independence with activities of daily living and functional mobility. (CONTINUE 2/10/17) (MET 3/8/17) NEW LTG 2: Patient will improve their fibromyalgia scale score to less than 50 points within 6 weeks in order to improve activity tolerance and pain free independence with activities of daily living and functional mobility (START 3/8/17)  Patient will improve their physical subscale score for modified fatigue impact scale to 28 points within 8 weeks in order to improve activity tolerance. (CONTINUE 2/10/17) (MET 3/8/17) NEW LTG 3: Patient will improve their physical subscale score for modified fatigue impact scale to less than 20 points within 6 weeks in order to improve activity tolerance (START 3/8/17)  Rehabilitation Potential For Stated Goals: Good  Regarding Amaya Gan's therapy, I certify that the treatment plan above will be carried out by a therapist or under their direction.   Thank you for this referral,  Ishmael Gallegos, PTA Referring Physician Signature: Margarita Neri                    HISTORY:   History of Present Injury/Illness (Reason for Referral):  Ulanda Dakins notes she has been diagnosed with fibromyalgia since 1992. She notes symptoms are progressively getting worse. She notes she is finding it harder to push through the symptoms. She requires assistance with functional transfers, bed mobility, and activities of daily living. She does work full time but notes frequent days she misses due to fibromyalgia symptoms. She notes she missed 7 days in December 2016. She reports pain in low back as nerve pain being pulled and tingling pain. She notes pain in posterior legs with straight leg raise and slump test this date. She declined to move low back during movement assessment due to fear of pain. 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 Job requirements include prolonged sitting, computer use, phone use  Ulanda Dakins requires assistance from  to stand from any surface, bed mobility, get in/out of car, bathe (get in/out of tub), cook, clean, dress at times  Personal Factors:          Sex:  female        Age:  41 y.o.   Current Medications: toprimate, trazadone, vitamin D2, diaxapam, flexeril, loritab, ambien   Date Last Reviewed:  4/4/2017   Number of Personal Factors/Comorbidities that affect the Plan of Care: 3+: HIGH COMPLEXITY   EXAMINATION:   Observation/Orthostatic Postural Assessment:  Assessed 1/4/17:    Ulanda Dakins sits with forward head and rounded shoulders which indicate tight anterior chest musculature, upper trapezius, and levator scapula and weak posterior scapula musculature and deep cervical flexors. She stands with left iliac crest superior to right, left lower extremity longer in supine (unable to assess in long sitting due to pain). She presents with convex curve to left in thoracic region, convex curve to right in lumbar spine. Right shoulder inferior to left,   Palpation:  Assessed 1/4/17        Rashard Urrutia is tender to palpate whole body throughout evaluation  ROM:  Assessed 1/4/17  Dorsiflexion to neutral with knee extended - limited by pain in posterior thighs and low back          Selective Functional Movement Assessment  Nonfunctional and painful  Cervical flexion, extension, rotation/side bend bilaterally 75% - pain mid back with all motions  appleys scratch test external rotation to T4 bilaterally - pain in forearms  appleys scratch test internal rotation to thoracolumbar region bilaterally - pain in shoulders  Multisegmental flexion and extension - decline to perform due to fear of increased pain  Multisegmental rotation bilaterally - 10% pain L5 region  Single leg stance less than 3 seconds bilaterally - performed independently  Strength:  Assessed 3/8/17        4/5 all major muscle groups bilateral upper and lower extremities   Special Tests:  Assessed 1/4/17:        Slump test - positive bilaterally  Straight leg raise - positive bilaterally 20 degrees  Neurological Screen: Assessed 3/8/17         Myotomes:  Strong bilateral upper and lower extremities         Dermatomes:  Tingling noted bilateral lower extremities, feet, hands        Reflexes:   Will assess as able  Functional Mobility: Assessed 3/8/17:        Gait/Ambulation:  Rashard Urrutia ambulates with modified independence, she ambulates with improved step length and gait speed        Transfers:  Rashard Urrutia is modified independent with functional transfers        Bed Mobility:  Lori Rue is modified independent with bed mobility        Stairs:  Lori Rue is modified independent with stairs  Balance:  Assessed 3/8/17        Tool Used: Timed Up and Go (TUG)  Score:  Initial: 30 seconds 17.39 seconds 9 seconds   Interpretation of Score: The test measures, in seconds, the time taken by an individual to stand up from a standard arm chair (seat height 46 cm [18 in], arm height 65 cm [25.6 in]), walk a distance of 3 meters (118 in, approx 10 ft), turn, walk back to the chair and sit down. If the individual takes longer than 14 seconds to complete TUG, this indicates risk for falls. Score 7 7.5-10.5 11-14 14.5-17.5 18-21 21.5-24.5 25+   Modifier CH CI CJ CK CL CM CN     Mental Status:  Assessed 1/4/17        Alert and oriented x 4  Vitals: Will assess as able     Body Structures Involved:  1. Nerves  2. Bones  3. Joints  4. Muscles  5. Ligaments Body Functions Affected:  1. Sensory/Pain  2. Neuromusculoskeletal  3. Movement Related  4. Skin Related Activities and Participation Affected:  1. Learning and Applying Knowledge  2. General Tasks and Demands  3. Mobility  4. Self Care  5. Domestic Life  6. Interpersonal Interactions and Relationships  7.  Community, Social and [place]   Number of elements that affect the Plan of Care: 4+: HIGH COMPLEXITY   CLINICAL PRESENTATION:   Presentation: Evolving clinical presentation with unstable and unpredictable characteristics: HIGH COMPLEXITY   CLINICAL DECISION MAKING:   Outcome Measure: Assessed 3/8/17    Tool Used: Fibromyalgia Impact Questionnaire  Score:  Initial: 72.73 68.88 51.265   Interpretation of Score: <40 = Mild Fibromyalgia Syndrome   40-60 = Moderate Fibromyalgia Syndrome   >60-70 = Severe Fibromyalgia Syndrome  Score 0 1-18 19-37 38-57 58-77 78-96 97   Modifier CH CI CJ CK CL CM CN     Tool Used: Plascencia Depression Index   Score:  Initial: 14 10 7   Interpretation of Score: 0-10 = ups and downs considered normal   11-16= mild mood disturbance   17-20= borderline clinical depression   21-30= moderate depression   31-40= severe depression   Over 40= extreme depression     Tool Used: Modified Fatigue Impact Scale  Score:  Initial: Total score: 42/84  Physical subscale score: 32  Cognitive subscale score: 3  Psychosocial subscale score: 7 Total score: 42/84  Physical subscale score: 32  Cognitive subscale score: 6  psychosocial subscale score: 4 Total score: 25/84  Physical subscale score: 22  Cognitive subscale score: 0  Psychosocial subscale score: 3     Tool Used: 6-MINUTE WALK TEST  Score:  Initial: 400 feet - one standing rest 800 feet - no rests  1375 feet   Interpretation of Score: Normal range varies but is approximately 6818-9301 Feet    Medical Necessity:   · Patient is expected to demonstrate progress in strength, range of motion, balance, coordination and functional technique to decrease assistance required with functional mobility and increase independence with pain free functional mobility. · Patient demonstrates good rehab potential due to higher previous functional level. Reason for Services/Other Comments:  · Patient continues to require skilled intervention due to increased pain and decreased independence with functional mobility. Use of outcome tool(s) and clinical judgement create a POC that gives a: Difficult prediction of patient's progress: HIGH COMPLEXITY   TREATMENT:      Aquatic Therapy (45 minutes):  Aquatic treatment performed per flow grid for Decreased muscle strength, Decreased endurance and Decreased static/dynamic balance and reactive control.       Aquatic Exercise Log       2-23-17 2-28-17 3-2-17 4-4-17   Activity/ Exercise     In 5 feet     Walking forward 2 laps 2 laps 2 laps 2 laps   Walking backward 2 laps 2 laps 2 laps 2 laps   Walking sideways 2 laps 2 laps 2 laps 2 laps      Marching 2 laps 2 laps 2 laps 2 laps      Goose Step 2 laps 2 laps 2 laps 2 laps      Tip toes             Heels             Lunges          Side step squats          LE Exercises noodle noodle noodle noodle      Hip Flex/Ext Marching x 20 B Marching x 20 B Marching x 20 B Marching x 20 B      Hip Abd/Add X 20 B X 20 B X 20 B X 20 B      Hip IR/ER             Calf raises             Knee Flex             Squats             Leg Circles X 10 each way each leg Deep end x 10 each way each leg Deep end x 10 each way each leg Deeper water x 10 each way each leg      Step Ups          UE Exercises noodle   noodle noodle      Squeeze In X 20 B   X 20 B X 20 B      Push Down X 20 B   X 20 B X 20 B      Pull Down X 20 B   X 20 B X 20 B      Bicep/Tricep          Rows/Press outs   Push ups x 20  Push ups x 20  Push upsx 20 with assist    Chi Positions             Trunk Rotation          Deep H2O/ Noodles noodle noodle noodle noodle      Stabilization             Arms only Straddle - 2 laps Straddle - 2 laps Straddle - 2 laps Straddle - 2 laps      Legs only Straddle - 2 laps  Both - 2 laps Straddle - 2 laps  Both - 4 laps Both - straddle - 4 laps Both - straddle - 2 laps   Cross   [place] 2 x 20  5 min 2 x 25 25      Scissors 2 x 20  5 min 2 x 25 25      Crab walk Straddle - 2 laps 4 laps 4 laps 2 laps   Lower abdominal   work  2 sequences x 10 each way 2 sequences x 15 each way 2 sequences x 10 each way       Cardio             Jogging   In deep end  In deep  In deep with noodles   Lap   Swimming             Stretches whirlpool whirlpool whirlpool whirlpool      Hamstrings X 3 B X 3 B X 3 B X 3 B      Heelcords X 3 B X 3 B X 3 B X 3 B      Piriformis X 3 B X 3 B X 3 B X 3 B      Neck                        * For increased soft tissue extensibility a warm water (over 100°F) environment was utilized.  Supervision/monitoring was provided at all times.              Treatment/Session Assessment:   · Pre-treatment Symptoms:  Mya Baas notes 5.5/10 pain in low back    · Pain: Initial:     5.5/10 With complaint of ankle swelling Post Session: 4/10 ·   Compliance with Program/Exercises: compliant with home exercise program   · Recommendations/Intent for next treatment session: \"Next visit will focus on advancements to more challenging activities and reduction in assistance provided\".   Total Treatment Duration:  45 minutes   PT Patient Time In/Time Out  Time In: 1240 (Patient about 10 minutes late today)  Time Out: [address], PTA

## 2017-04-06 ENCOUNTER — HOSPITAL ENCOUNTER (OUTPATIENT)
Dept: PHYSICAL THERAPY | Age: 47
Discharge: HOME | End: 2017-04-06
Attending: FAMILY MEDICINE
Payer: COMMERCIAL

## 2017-04-06 PROCEDURE — 97113 AQUATIC THERAPY/EXERCISES: CPT

## 2017-04-07 NOTE — PROGRESS NOTES
Marek Rojas  : 1970 [facility] at [address], [facility]  [phone]   [phone]        OUTPATIENT PHYSICAL THERAPY:Daily Note and Aquatic Note 2017    ICD-10: Treatment Diagnosis: fibromyalgia (M79.7)  Difficulty in walking, not elsewhere classified (R26.2)  Low back pain (M54.5)  Precautions/Allergies:   Percocet [oxycodone-acetaminophen] and Morphine   Fall Risk Score: 4 (? 5 = High Risk)  MD Orders: Evaluate and treat MEDICAL/REFERRING DIAGNOSIS:  Fibromyalgia   DATE OF ONSET: 18, diagnosed in , symptoms progressively getting worse  REFERRING PHYSICIAN: [address]: none scheduled at this time   Marek Rojas has attended 13 physical therapy sessions including initial evaluation. INITIAL ASSESSMENT: 3/8/17   Ms. Luba Verde presents with moderate self reported fibromyalgia symptoms, moderate self reported physical fatigue, minimal self reported overall fatigue (physical, cognitive, and psychosocial), and self reported normal ups and downs for Plascencia Depression Index. She presents with postural and gait deviations listed in detail below. She presents with improved timed up and go assessment score with decreased risk of falling. She presents with improved independence with functional mobility this date. She would benefit from skilled physical therapy in order to improve pain free independence with functional mobility, reduce fall risk and prevent future impairment. PROBLEM LIST (Impacting functional limitations):  1. Decreased Strength  2. Decreased ADL/Functional Activities  3. Decreased Transfer Abilities  4. Decreased Ambulation Ability/Technique  5. Decreased Balance  6. Increased Pain  7. Decreased Activity Tolerance  8. Decreased Pacing Skills  9. Decreased Work Simplification/Energy Conservation Techniques  10. Increased Fatigue  11.  Decreased Flexibility/Joint Mobility  12. Decreased [place] with Home Exercise Program INTERVENTIONS PLANNED:  1. Balance Exercise  2. Bed Mobility  3. Cold  4. Family Education  5. Gait Training  6. Heat  7. Home Exercise Program (HEP)  8. Manual Therapy  9. Neuromuscular Re-education/Strengthening  10. Range of Motion (ROM)  11. Therapeutic Activites  12. Therapeutic Exercise/Strengthening  13. Transfer Training  14. aquatic therapy   TREATMENT PLAN:  Effective Dates: 3/8/17 to 6/8/17    Frequency/Duration: 2 times a week for 8 weeks  GOALS: (Goals have been discussed and agreed upon with patient.)  DISCHARGE GOALS: Time Frame: 8 weeks  Patient will be independent with home exercise program within 8 weeks in order to improve independence with management of patient's symptoms and/or functional deficits. (CONTINUE 2/10/17) (CONTINUE 3/8/17)  Patient will improve their fibromyalgia impact scale score to less than 60 points within 8 weeks in order to improve activity tolerance and pain free independence with activities of daily living and functional mobility. (CONTINUE 2/10/17) (MET 3/8/17) NEW LTG 2: Patient will improve their fibromyalgia scale score to less than 50 points within 6 weeks in order to improve activity tolerance and pain free independence with activities of daily living and functional mobility (START 3/8/17)  Patient will improve their physical subscale score for modified fatigue impact scale to 28 points within 8 weeks in order to improve activity tolerance. (CONTINUE 2/10/17) (MET 3/8/17) NEW LTG 3: Patient will improve their physical subscale score for modified fatigue impact scale to less than 20 points within 6 weeks in order to improve activity tolerance (START 3/8/17)  Rehabilitation Potential For Stated Goals: Good  Regarding Amaya Gan's therapy, I certify that the treatment plan above will be carried out by a therapist or under their direction.   Thank you for this referral,  Arslan Barrett, PTA Referring Physician Signature: Italia Cedeno          Date                    HISTORY:   History of Present Injury/Illness (Reason for Referral):  Kelli Vann notes she has been diagnosed with fibromyalgia since 1992. She notes symptoms are progressively getting worse. She notes she is finding it harder to push through the symptoms. She requires assistance with functional transfers, bed mobility, and activities of daily living. She does work full time but notes frequent days she misses due to fibromyalgia symptoms. She notes she missed 7 days in December 2016. She reports pain in low back as nerve pain being pulled and tingling pain. She notes pain in posterior legs with straight leg raise and slump test this date. She declined to move low back during movement assessment due to fear of pain. 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 Job requirements include prolonged sitting, computer use, phone use  Kelli Vann requires assistance from  to stand from any surface, bed mobility, get in/out of car, bathe (get in/out of tub), cook, clean, dress at times  Personal Factors:          Sex:  female        Age:  41 y.o.   Current Medications: toprimate, trazadone, vitamin D2, diaxapam, flexeril, loritab, ambien   Date Last Reviewed:  4/6/2017   Number of Personal Factors/Comorbidities that affect the Plan of Care: 3+: HIGH COMPLEXITY   EXAMINATION:   Observation/Orthostatic Postural Assessment:  Assessed 1/4/17:    Kelil Vann sits with forward head and rounded shoulders which indicate tight anterior chest musculature, upper trapezius, and levator scapula and weak posterior scapula musculature and deep cervical flexors. She stands with left iliac crest superior to right, left lower extremity longer in supine (unable to assess in long sitting due to pain). She presents with convex curve to left in thoracic region, convex curve to right in lumbar spine. Right shoulder inferior to left,   Palpation:  Assessed 1/4/17        Diaz Russell is tender to palpate whole body throughout evaluation  ROM:  Assessed 1/4/17  Dorsiflexion to neutral with knee extended - limited by pain in posterior thighs and low back          Selective Functional Movement Assessment  Nonfunctional and painful  Cervical flexion, extension, rotation/side bend bilaterally 75% - pain mid back with all motions  appleys scratch test external rotation to T4 bilaterally - pain in forearms  appleys scratch test internal rotation to thoracolumbar region bilaterally - pain in shoulders  Multisegmental flexion and extension - decline to perform due to fear of increased pain  Multisegmental rotation bilaterally - 10% pain L5 region  Single leg stance less than 3 seconds bilaterally - performed independently  Strength:  Assessed 3/8/17        4/5 all major muscle groups bilateral upper and lower extremities   Special Tests:  Assessed 1/4/17:        Slump test - positive bilaterally  Straight leg raise - positive bilaterally 20 degrees  Neurological Screen: Assessed 3/8/17         Myotomes:  Strong bilateral upper and lower extremities         Dermatomes:  Tingling noted bilateral lower extremities, feet, hands        Reflexes:   Will assess as able  Functional Mobility: Assessed 3/8/17:        Gait/Ambulation:  Diaz Russell ambulates with modified independence, she ambulates with improved step length and gait speed        Transfers:  Diaz Russell is modified independent with functional transfers        Bed Mobility:  Nalini Coleman is modified independent with bed mobility        Stairs:  Nalini Coleman is modified independent with stairs  Balance:  Assessed 3/8/17        Tool Used: Timed Up and Go (TUG)  Score:  Initial: 30 seconds 17.39 seconds 9 seconds   Interpretation of Score: The test measures, in seconds, the time taken by an individual to stand up from a standard arm chair (seat height 46 cm [18 in], arm height 65 cm [25.6 in]), walk a distance of 3 meters (118 in, approx 10 ft), turn, walk back to the chair and sit down. If the individual takes longer than 14 seconds to complete TUG, this indicates risk for falls. Score 7 7.5-10.5 11-14 14.5-17.5 18-21 21.5-24.5 25+   Modifier CH CI CJ CK CL CM CN     Mental Status:  Assessed 1/4/17        Alert and oriented x 4  Vitals: Will assess as able     Body Structures Involved:  1. Nerves  2. Bones  3. Joints  4. Muscles  5. Ligaments Body Functions Affected:  1. Sensory/Pain  2. Neuromusculoskeletal  3. Movement Related  4. Skin Related Activities and Participation Affected:  1. Learning and Applying Knowledge  2. General Tasks and Demands  3. Mobility  4. Self Care  5. Domestic Life  6. Interpersonal Interactions and Relationships  7.  Community, Social and [place] Independence   Number of elements that affect the Plan of Care: 4+: HIGH COMPLEXITY   CLINICAL PRESENTATION:   Presentation: Evolving clinical presentation with unstable and unpredictable characteristics: HIGH COMPLEXITY   CLINICAL DECISION MAKING:   Outcome Measure: Assessed 3/8/17    Tool Used: Fibromyalgia Impact Questionnaire  Score:  Initial: 72.73 68.88 51.265   Interpretation of Score: <40 = Mild Fibromyalgia Syndrome   40-60 = Moderate Fibromyalgia Syndrome   >60-70 = Severe Fibromyalgia Syndrome  Score 0 1-18 19-37 38-57 58-77 78-96 97   Modifier CH CI CJ CK CL CM CN     Tool Used: Plascencia Depression Index   Score:  Initial: 14 10 7   Interpretation of Score: 0-10 = ups and downs considered normal   11-16= mild mood disturbance   17-20= borderline clinical depression   21-30= moderate depression   31-40= severe depression   Over 40= extreme depression     Tool Used: Modified Fatigue Impact Scale  Score:  Initial: Total score: 42/84  Physical subscale score: 32  Cognitive subscale score: 3  Psychosocial subscale score: 7 Total score: 42/84  Physical subscale score: 32  Cognitive subscale score: 6  psychosocial subscale score: 4 Total score: 25/84  Physical subscale score: 22  Cognitive subscale score: 0  Psychosocial subscale score: 3     Tool Used: 6-MINUTE WALK TEST  Score:  Initial: 400 feet - one standing rest 800 feet - no rests  1375 feet   Interpretation of Score: Normal range varies but is approximately 3295-5299 Feet    Medical Necessity:   · Patient is expected to demonstrate progress in strength, range of motion, balance, coordination and functional technique to decrease assistance required with functional mobility and increase independence with pain free functional mobility. · Patient demonstrates good rehab potential due to higher previous functional level. Reason for Services/Other Comments:  · Patient continues to require skilled intervention due to increased pain and decreased independence with functional mobility. Use of outcome tool(s) and clinical judgement create a POC that gives a: Difficult prediction of patient's progress: HIGH COMPLEXITY   TREATMENT:      Aquatic Therapy (45 minutes):  Aquatic treatment performed per flow grid for Decreased muscle strength, Decreased endurance and Decreased static/dynamic balance and reactive control.       Aquatic Exercise Log       2-23-17 2-28-17 3-2-17 4-4-17 4-6-17   Activity/ Exercise     In 5 feet      Walking forward 2 laps 2 laps 2 laps 2 laps 2 laps   Walking backward 2 laps 2 laps 2 laps 2 laps 2 laps   Walking sideways 2 laps 2 laps 2 laps 2 laps 2 laps      Marching 2 laps 2 laps 2 laps 2 laps 2 laps    Goose Step 2 laps 2 laps 2 laps 2 laps 2 laps      Tip toes              Heels              Lunges           Side step squats           LE Exercises noodle noodle noodle noodle noodle      Hip Flex/Ext Marching x 20 B Marching x 20 B Marching x 20 B Marching x 20 B Marching x 20 B      Hip Abd/Add X 20 B X 20 B X 20 B X 20 B X 20 B      Hip IR/ER              Calf raises              Knee Flex              Squats              Leg Circles X 10 each way each leg Deep end x 10 each way each leg Deep end x 10 each way each leg Deeper water x 10 each way each leg Deeper water x 10 each way each leg      Step Ups           UE Exercises noodle   noodle noodle noodle      Squeeze In X 20 B   X 20 B X 20 B X 20 B      Push Down X 20 B   X 20 B X 20 B X 20 B      Pull Down X 20 B   X 20 B X 20 B X 20 B      Bicep/Tricep           Rows/Press outs   Push ups x 20  Push ups x 20  Push upsx 20 with assist Push ups x 20 with no assist    Chi Positions              Trunk Rotation           Deep H2O/ Noodles noodle noodle noodle noodle noodle      Stabilization              Arms only Straddle - 2 laps Straddle - 2 laps Straddle - 2 laps Straddle - 2 laps Straddle - 2 laps      Legs only Straddle - 2 laps  Both - 2 laps Straddle - 2 laps  Both - 4 laps Both - straddle - 4 laps Both - straddle - 2 laps Straddle - 2 laps  Both - 2 laps   Cross   Country 2 x 20  5 min 2 x 25 25 X 25      Scissors 2 x 20  5 min 2 x 25 25 X 25      Crab walk Straddle - 2 laps 4 laps 4 laps 2 laps 2 laps   Lower abdominal   work  2 sequences x 10 each way 2 sequences x 15 each way 2 sequences x 10 each way        Cardio              Jogging   In deep end  In deep  In deep with noodles In deep    Lap   Swimming              Stretches whirlpool whirlpool whirlpool whirlpool whirlpool      Hamstrings X 3 B X 3 B X 3 B X 3 B X 3 B      Heelcords X 3 B X 3 B X 3 B X 3 B X 3 B      Piriformis X 3 B X 3 B X 3 B X 3 B X 3 B      Neck                      * For increased soft tissue extensibility a warm water (over 100°F) environment was utilized. Supervision/monitoring was provided at all times.              Treatment/Session Assessment:   · Pre-treatment Symptoms:  Shanna Murdock notes 5/10 pain in low back. She reports she had a very bad episode of insomnia last night. She reports not going to bed til after 4 and not waking til 11. · Pain: Initial:     5/10 Post Session:3- 4/10 ·   Compliance with Program/Exercises: compliant with home exercise program   · Recommendations/Intent for next treatment session: \"Next visit will focus on advancements to more challenging activities and reduction in assistance provided\".   Total Treatment Duration:  45 minutes   PT Patient Time In/Time Out  Time In: 1250 (Patient was 20 minutes late)  Time Out: 482 SUSU [place]

## 2017-04-11 ENCOUNTER — HOSPITAL ENCOUNTER (OUTPATIENT)
Dept: PHYSICAL THERAPY | Age: 47
Discharge: HOME | End: 2017-04-11
Attending: FAMILY MEDICINE
Payer: COMMERCIAL

## 2017-04-11 PROCEDURE — 97113 AQUATIC THERAPY/EXERCISES: CPT

## 2017-04-11 NOTE — PROGRESS NOTES
Ezequiel Vance  : 1970 [facility] at [address], [facility]  [phone]   [phone]        OUTPATIENT PHYSICAL THERAPY:Daily Note and Aquatic Note 2017    ICD-10: Treatment Diagnosis: fibromyalgia (M79.7)  Difficulty in walking, not elsewhere classified (R26.2)  Low back pain (M54.5)  Precautions/Allergies:   Percocet [oxycodone-acetaminophen] and Morphine   Fall Risk Score: 4 (? 5 = High Risk)  MD Orders: Evaluate and treat MEDICAL/REFERRING DIAGNOSIS:  Fibromyalgia   DATE OF ONSET: 16, diagnosed in , symptoms progressively getting worse  REFERRING PHYSICIAN: [address]: none scheduled at this time   Ezequiel Vance has attended 13 physical therapy sessions including initial evaluation. INITIAL ASSESSMENT: 3/8/17   Ms. Tiana Swartz presents with moderate self reported fibromyalgia symptoms, moderate self reported physical fatigue, minimal self reported overall fatigue (physical, cognitive, and psychosocial), and self reported normal ups and downs for Plascencia Depression Index. She presents with postural and gait deviations listed in detail below. She presents with improved timed up and go assessment score with decreased risk of falling. She presents with improved independence with functional mobility this date. She would benefit from skilled physical therapy in order to improve pain free independence with functional mobility, reduce fall risk and prevent future impairment. PROBLEM LIST (Impacting functional limitations):  1. Decreased Strength  2. Decreased ADL/Functional Activities  3. Decreased Transfer Abilities  4. Decreased Ambulation Ability/Technique  5. Decreased Balance  6. Increased Pain  7. Decreased Activity Tolerance  8. Decreased Pacing Skills  9. Decreased Work Simplification/Energy Conservation Techniques  10. Increased Fatigue  11.  Decreased Flexibility/Joint Mobility  12. Decreased Benson with Home Exercise Program INTERVENTIONS PLANNED:  1. Balance Exercise  2. Bed Mobility  3. Cold  4. Family Education  5. Gait Training  6. Heat  7. Home Exercise Program (HEP)  8. Manual Therapy  9. Neuromuscular Re-education/Strengthening  10. Range of Motion (ROM)  11. Therapeutic Activites  12. Therapeutic Exercise/Strengthening  13. Transfer Training  14. aquatic therapy   TREATMENT PLAN:  Effective Dates: 3/8/17 to 6/8/17    Frequency/Duration: 2 times a week for 8 weeks  GOALS: (Goals have been discussed and agreed upon with patient.)  DISCHARGE GOALS: Time Frame: 8 weeks  Patient will be independent with home exercise program within 8 weeks in order to improve independence with management of patient's symptoms and/or functional deficits. (CONTINUE 2/10/17) (CONTINUE 3/8/17)  Patient will improve their fibromyalgia impact scale score to less than 60 points within 8 weeks in order to improve activity tolerance and pain free independence with activities of daily living and functional mobility. (CONTINUE 2/10/17) (MET 3/8/17) NEW LTG 2: Patient will improve their fibromyalgia scale score to less than 50 points within 6 weeks in order to improve activity tolerance and pain free independence with activities of daily living and functional mobility (START 3/8/17)  Patient will improve their physical subscale score for modified fatigue impact scale to 28 points within 8 weeks in order to improve activity tolerance. (CONTINUE 2/10/17) (MET 3/8/17) NEW LTG 3: Patient will improve their physical subscale score for modified fatigue impact scale to less than 20 points within 6 weeks in order to improve activity tolerance (START 3/8/17)  Rehabilitation Potential For Stated Goals: Good  Regarding Amaya Gan's therapy, I certify that the treatment plan above will be carried out by a therapist or under their direction.   Thank you for this referral,  Honorio Baez, PTA Referring Physician Signature: Karthik Maurice          Date                    HISTORY:   History of Present Injury/Illness (Reason for Referral):  Deisy Hunter notes she has been diagnosed with fibromyalgia since 1992. She notes symptoms are progressively getting worse. She notes she is finding it harder to push through the symptoms. She requires assistance with functional transfers, bed mobility, and activities of daily living. She does work full time but notes frequent days she misses due to fibromyalgia symptoms. She notes she missed 7 days in December 2016. She reports pain in low back as nerve pain being pulled and tingling pain. She notes pain in posterior legs with straight leg raise and slump test this date. She declined to move low back during movement assessment due to fear of pain. 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 Job requirements include prolonged sitting, computer use, phone use  Deisy Hunter requires assistance from  to stand from any surface, bed mobility, get in/out of car, bathe (get in/out of tub), cook, clean, dress at times  Personal Factors:          Sex:  female        Age:  41 y.o.   Current Medications: toprimate, trazadone, vitamin D2, diaxapam, flexeril, loritab, ambien   Date Last Reviewed:  4/11/2017   Number of Personal Factors/Comorbidities that affect the Plan of Care: 3+: HIGH COMPLEXITY   EXAMINATION:   Observation/Orthostatic Postural Assessment:  Assessed 1/4/17:    Deisy Hunter sits with forward head and rounded shoulders which indicate tight anterior chest musculature, upper trapezius, and levator scapula and weak posterior scapula musculature and deep cervical flexors. She stands with left iliac crest superior to right, left lower extremity longer in supine (unable to assess in long sitting due to pain). She presents with convex curve to left in thoracic region, convex curve to right in lumbar spine. Right shoulder inferior to left,   Palpation:  Assessed 1/4/17        Arlet Mccartney is tender to palpate whole body throughout evaluation  ROM:  Assessed 1/4/17  Dorsiflexion to neutral with knee extended - limited by pain in posterior thighs and low back          Selective Functional Movement Assessment  Nonfunctional and painful  Cervical flexion, extension, rotation/side bend bilaterally 75% - pain mid back with all motions  appleys scratch test external rotation to T4 bilaterally - pain in forearms  appleys scratch test internal rotation to thoracolumbar region bilaterally - pain in shoulders  Multisegmental flexion and extension - decline to perform due to fear of increased pain  Multisegmental rotation bilaterally - 10% pain L5 region  Single leg stance less than 3 seconds bilaterally - performed independently  Strength:  Assessed 3/8/17        4/5 all major muscle groups bilateral upper and lower extremities   Special Tests:  Assessed 1/4/17:        Slump test - positive bilaterally  Straight leg raise - positive bilaterally 20 degrees  Neurological Screen: Assessed 3/8/17         Myotomes:  Strong bilateral upper and lower extremities         Dermatomes:  Tingling noted bilateral lower extremities, feet, hands        Reflexes:   Will assess as able  Functional Mobility: Assessed 3/8/17:        Gait/Ambulation:  Arlet Mccartney ambulates with modified independence, she ambulates with improved step length and gait speed        Transfers:  Arlet Mccartney is modified independent with functional transfers        Bed Mobility:  Minor Urrutia is modified independent with bed mobility        Stairs:  Minor Urrutia is modified independent with stairs  Balance:  Assessed 3/8/17        Tool Used: Timed Up and Go (TUG)  Score:  Initial: 30 seconds 17.39 seconds 9 seconds   Interpretation of Score: The test measures, in seconds, the time taken by an individual to stand up from a standard arm chair (seat height 46 cm [18 in], arm height 65 cm [25.6 in]), walk a distance of 3 meters (118 in, approx 10 ft), turn, walk back to the chair and sit down. If the individual takes longer than 14 seconds to complete TUG, this indicates risk for falls. Score 7 7.5-10.5 11-14 14.5-17.5 18-21 21.5-24.5 25+   Modifier CH CI CJ CK CL CM CN     Mental Status:  Assessed 1/4/17        Alert and oriented x 4  Vitals: Will assess as able     Body Structures Involved:  1. Nerves  2. Bones  3. Joints  4. Muscles  5. Ligaments Body Functions Affected:  1. Sensory/Pain  2. Neuromusculoskeletal  3. Movement Related  4. Skin Related Activities and Participation Affected:  1. Learning and Applying Knowledge  2. General Tasks and Demands  3. Mobility  4. Self Care  5. Domestic Life  6. Interpersonal Interactions and Relationships  7.  Community, Social and [place]   Number of elements that affect the Plan of Care: 4+: HIGH COMPLEXITY   CLINICAL PRESENTATION:   Presentation: Evolving clinical presentation with unstable and unpredictable characteristics: HIGH COMPLEXITY   CLINICAL DECISION MAKING:   Outcome Measure: Assessed 3/8/17    Tool Used: Fibromyalgia Impact Questionnaire  Score:  Initial: 72.73 68.88 51.265   Interpretation of Score: <40 = Mild Fibromyalgia Syndrome   40-60 = Moderate Fibromyalgia Syndrome   >60-70 = Severe Fibromyalgia Syndrome  Score 0 1-18 19-37 38-57 58-77 78-96 97   Modifier CH CI CJ CK CL CM CN     Tool Used: Plascencia Depression Index   Score:  Initial: 14 10 7   Interpretation of Score: 0-10 = ups and downs considered normal   11-16= mild mood disturbance   17-20= borderline clinical depression   21-30= moderate depression   31-40= severe depression   Over 40= extreme depression     Tool Used: Modified Fatigue Impact Scale  Score:  Initial: Total score: 42/84  Physical subscale score: 32  Cognitive subscale score: 3  Psychosocial subscale score: 7 Total score: 42/84  Physical subscale score: 32  Cognitive subscale score: 6  psychosocial subscale score: 4 Total score: 25/84  Physical subscale score: 22  Cognitive subscale score: 0  Psychosocial subscale score: 3     Tool Used: 6-MINUTE WALK TEST  Score:  Initial: 400 feet - one standing rest 800 feet - no rests  1375 feet   Interpretation of Score: Normal range varies but is approximately 7039-5114 Feet    Medical Necessity:   · Patient is expected to demonstrate progress in strength, range of motion, balance, coordination and functional technique to decrease assistance required with functional mobility and increase independence with pain free functional mobility. · Patient demonstrates good rehab potential due to higher previous functional level. Reason for Services/Other Comments:  · Patient continues to require skilled intervention due to increased pain and decreased independence with functional mobility. Use of outcome tool(s) and clinical judgement create a POC that gives a: Difficult prediction of patient's progress: HIGH COMPLEXITY   TREATMENT:      Aquatic Therapy (45 minutes):  Aquatic treatment performed per flow grid for Decreased muscle strength, Decreased endurance and Decreased static/dynamic balance and reactive control.       Aquatic Exercise Log       2-23-17 2-28-17 3-2-17 4-4-17 4-6-17 4-11-17   Activity/ Exercise     In 5 feet       Walking forward 2 laps 2 laps 2 laps 2 laps 2 laps 2 laps   Walking backward 2 laps 2 laps 2 laps 2 laps 2 laps 2 laps   Walking sideways 2 laps 2 laps 2 laps 2 laps 2 laps 2 laps      Marching 2 laps 2 laps 2 laps 2 laps 2 laps 2 laps      Goose Step 2 laps 2 laps 2 laps 2 laps 2 laps 2 laps      Tip toes               Heels               Lunges            Side step squats            LE Exercises noodle noodle noodle noodle noodle noodle      Hip Flex/Ext Marching x 20 B Marching x 20 B Marching x 20 B Marching x 20 B Marching x 20 B Marching x 20 B      Hip Abd/Add X 20 B X 20 B X 20 B X 20 B X 20 B x20 B      Hip IR/ER               Calf raises               Knee Flex               Squats               Leg Circles X 10 each way each leg Deep end x 10 each way each leg Deep end x 10 each way each leg Deeper water x 10 each way each leg Deeper water x 10 each way each leg Deeper water x 10 each way each leg      Step Ups            UE Exercises noodle   noodle noodle noodle noodle      Squeeze In X 20 B   X 20 B X 20 B X 20 B X 20 B      Push Down X 20 B   X 20 B X 20 B X 20 B X 20 B      Pull Down X 20 B   X 20 B X 20 B X 20 B       Bicep/Tricep            Rows/Press outs   Push ups x 20  Push ups x 20  Push upsx 20 with assist Push ups x 20 with no assist Push up x 20 with no therapist assist    Chi Positions               Trunk Rotation            Deep H2O/ Noodles noodle noodle noodle noodle noodle noodle      Stabilization               Arms only Straddle - 2 laps Straddle - 2 laps Straddle - 2 laps Straddle - 2 laps Straddle - 2 laps Straddle - 2 laps      Legs only Straddle - 2 laps  Both - 2 laps Straddle - 2 laps  Both - 4 laps Both - straddle - 4 laps Both - straddle - 2 laps Straddle - 2 laps  Both - 2 laps Straddle - 2 laps  Both - 2 laps   Cross   Country 2 x 20  5 min 2 x 25 25 X 25 X 25       Scissors 2 x 20  5 min 2 x 25 25 X 25 X 25      Crab walk Straddle - 2 laps 4 laps 4 laps 2 laps 2 laps 2 laps   Lower abdominal   work  2 sequences x 10 each way 2 sequences x 15 each way 2 sequences x 10 each way   2 sequences x 10 each way      Cardio               Jogging   In deep end  In deep  In deep with noodles In deep  In deeper water   Lap   Swimming               Stretches whirlpool whirlpool whirlpool whirlpool whirlpool Step of pool      Hamstrings X 3 B X 3 B X 3 B X 3 B X 3 B X 3 B      Heelcords X 3 B X 3 B X 3 B X 3 B X 3 B X 3 B       Piriformis X 3 B X 3 B X 3 B X 3 B X 3 B X 3 B      Neck                            * For increased soft tissue extensibility a warm water (over 100°F) environment was utilized. Supervision/monitoring was provided at all times.              Treatment/Session Assessment:   · Pre-treatment Symptoms:  Kaylee Ram notes 4-5/10 pain in low back. She reports having a good day. · Pain: Initial:     4-5/10 Post Session: 3/10 ·   Compliance with Program/Exercises: compliant with home exercise program   · Recommendations/Intent for next treatment session: \"Next visit will focus on advancements to more challenging activities and reduction in assistance provided\".   Total Treatment Duration:  45 minutes   PT Patient Time In/Time Out  Time In: 1230  Time Out: Marbella [address], PTA                 b

## 2017-04-18 ENCOUNTER — HOSPITAL ENCOUNTER (OUTPATIENT)
Dept: PHYSICAL THERAPY | Age: 47
Discharge: HOME | End: 2017-04-18
Attending: FAMILY MEDICINE
Payer: COMMERCIAL

## 2017-04-18 NOTE — PROGRESS NOTES
Ritu Langley  : 1970 [facility] at [address], [address] [place] [address]  [phone]   [phone]            2017       Patient called to cancel her aquatic therapy appointment due to her mother being in the hospital in ICU. Will follow up with the patient at the next visit.   Francisco Fields, PTA

## 2017-04-20 ENCOUNTER — APPOINTMENT (OUTPATIENT)
Dept: PHYSICAL THERAPY | Age: 47
End: 2017-04-20
Attending: FAMILY MEDICINE
Payer: COMMERCIAL

## 2017-05-31 ENCOUNTER — HOSPITAL ENCOUNTER (OUTPATIENT)
Dept: GENERAL RADIOLOGY | Age: 47
Discharge: HOME | End: 2017-05-31
Attending: FAMILY MEDICINE
Payer: COMMERCIAL

## 2017-05-31 DIAGNOSIS — M79.671 RIGHT FOOT PAIN: ICD-10-CM

## 2017-05-31 DIAGNOSIS — M25.571 ACUTE RIGHT ANKLE PAIN: ICD-10-CM

## 2017-05-31 PROCEDURE — 73600 X-RAY EXAM OF ANKLE: CPT

## 2017-05-31 PROCEDURE — 73630 X-RAY EXAM OF FOOT: CPT

## 2017-05-31 NOTE — PROGRESS NOTES
Please tell Mrs. Damian Patel that her xray is negative for fracture on foot or ankle    Just a sprain. Apply cold compresses. Continue current med. Will let her know when letter is ready.   Yaritza Valderrama MD

## 2017-10-24 ENCOUNTER — HOSPITAL ENCOUNTER (OUTPATIENT)
Dept: GENERAL RADIOLOGY | Age: 47
Discharge: HOME | End: 2017-10-24
Attending: FAMILY MEDICINE
Payer: COMMERCIAL

## 2017-10-24 DIAGNOSIS — M25.512 ACUTE PAIN OF LEFT SHOULDER: ICD-10-CM

## 2017-10-24 DIAGNOSIS — M54.2 NECK PAIN: ICD-10-CM

## 2017-10-24 PROCEDURE — 72050 X-RAY EXAM NECK SPINE 4/5VWS: CPT

## 2017-10-24 PROCEDURE — 73030 X-RAY EXAM OF SHOULDER: CPT

## 2017-10-24 NOTE — PROGRESS NOTES
Shoulder xray is negative  Neck xray shows loss of normal curvature due to muscle spasms. No evidence of disc or vertebral issues. Use pain medication as needed only   Pleas take muscle relaxer: cyclobenzaprine and diazepam for the spasms. Ok to return to work.   Lg Duarte MD

## 2018-12-21 PROBLEM — E66.01 SEVERE OBESITY (HCC): Status: ACTIVE | Noted: 2018-12-21

## 2019-01-19 ENCOUNTER — HOSPITAL ENCOUNTER (OUTPATIENT)
Dept: MRI IMAGING | Age: 49
Discharge: HOME | End: 2019-01-19
Attending: NURSE PRACTITIONER
Payer: COMMERCIAL

## 2019-01-19 DIAGNOSIS — G43.019 INTRACTABLE MIGRAINE WITHOUT AURA AND WITHOUT STATUS MIGRAINOSUS: Chronic | ICD-10-CM

## 2019-01-19 PROCEDURE — 70551 MRI BRAIN STEM W/O DYE: CPT

## 2019-01-29 PROBLEM — E78.5 DYSLIPIDEMIA: Status: ACTIVE | Noted: 2019-01-29

## 2019-02-20 ENCOUNTER — HOSPITAL ENCOUNTER (OUTPATIENT)
Dept: MAMMOGRAPHY | Age: 49
Discharge: HOME | End: 2019-02-20
Attending: NURSE PRACTITIONER
Payer: COMMERCIAL

## 2019-02-20 DIAGNOSIS — Z12.39 SCREENING FOR MALIGNANT NEOPLASM OF BREAST: ICD-10-CM

## 2019-02-20 PROCEDURE — 77067 SCR MAMMO BI INCL CAD: CPT

## 2020-06-02 ENCOUNTER — HOSPITAL ENCOUNTER (OUTPATIENT)
Dept: MAMMOGRAPHY | Age: 50
Discharge: HOME | End: 2020-06-02
Attending: NURSE PRACTITIONER
Payer: COMMERCIAL

## 2020-06-02 DIAGNOSIS — Z12.39 SCREENING FOR MALIGNANT NEOPLASM OF BREAST: ICD-10-CM

## 2020-06-02 PROCEDURE — 77067 SCR MAMMO BI INCL CAD: CPT

## 2022-03-19 PROBLEM — E66.01 SEVERE OBESITY (HCC): Status: ACTIVE | Noted: 2018-12-21

## 2022-03-19 PROBLEM — E04.2 MULTIPLE THYROID NODULES: Status: ACTIVE | Noted: 2017-01-23

## 2022-03-19 PROBLEM — E78.5 DYSLIPIDEMIA: Status: ACTIVE | Noted: 2019-01-29

## 2022-04-19 ENCOUNTER — ANESTHESIA EVENT (OUTPATIENT)
Dept: SURGERY | Age: 52
End: 2022-04-19
Payer: SELF-PAY

## 2022-04-19 NOTE — ANESTHESIA PREPROCEDURE EVALUATION
Relevant Problems   No relevant active problems       Anesthetic History               Review of Systems / Medical History  Patient summary reviewed and pertinent labs reviewed    Pulmonary        Sleep apnea           Neuro/Psych         Headaches (Migraine)     Cardiovascular              Hyperlipidemia    Exercise tolerance: >4 METS     GI/Hepatic/Renal                Endo/Other        Obesity, arthritis and anemia     Other Findings   Comments: Fibromyalgia           Physical Exam    Airway  Mallampati: II  TM Distance: > 6 cm  Neck ROM: normal range of motion   Mouth opening: Normal     Cardiovascular  Regular rate and rhythm,  S1 and S2 normal,  no murmur, click, rub, or gallop             Dental  No notable dental hx       Pulmonary  Breath sounds clear to auscultation               Abdominal         Other Findings            Anesthetic Plan    ASA: 2  Anesthesia type: general            Anesthetic plan and risks discussed with: Patient

## 2022-04-20 ENCOUNTER — ANESTHESIA (OUTPATIENT)
Dept: SURGERY | Age: 52
End: 2022-04-20
Payer: SELF-PAY

## 2022-04-20 ENCOUNTER — HOSPITAL ENCOUNTER (OUTPATIENT)
Age: 52
Setting detail: OUTPATIENT SURGERY
Discharge: HOME | End: 2022-04-20
Attending: SPECIALIST | Admitting: SPECIALIST
Payer: SELF-PAY

## 2022-04-20 VITALS
HEART RATE: 71 BPM | BODY MASS INDEX: 32.2 KG/M2 | SYSTOLIC BLOOD PRESSURE: 118 MMHG | OXYGEN SATURATION: 99 % | HEIGHT: 71 IN | RESPIRATION RATE: 53 BRPM | WEIGHT: 230 LBS | TEMPERATURE: 98.4 F | DIASTOLIC BLOOD PRESSURE: 61 MMHG

## 2022-04-20 LAB
ANION GAP SERPL CALC-SCNC: 6 MMOL/L (ref 7–16)
BUN SERPL-MCNC: 13 MG/DL (ref 6–23)
CALCIUM SERPL-MCNC: 8.7 MG/DL (ref 8.3–10.4)
CHLORIDE SERPL-SCNC: 118 MMOL/L (ref 98–107)
CO2 SERPL-SCNC: 21 MMOL/L (ref 21–32)
CREAT SERPL-MCNC: 1.2 MG/DL (ref 0.6–1)
ERYTHROCYTE [DISTWIDTH] IN BLOOD BY AUTOMATED COUNT: 13.7 % (ref 11.9–14.6)
GLUCOSE SERPL-MCNC: 78 MG/DL (ref 65–100)
HCT VFR BLD AUTO: 39.4 % (ref 35.8–46.3)
HGB BLD-MCNC: 11.9 G/DL (ref 11.7–15.4)
MCH RBC QN AUTO: 28.3 PG (ref 26.1–32.9)
MCHC RBC AUTO-ENTMCNC: 30.2 G/DL (ref 31.4–35)
MCV RBC AUTO: 93.6 FL (ref 79.6–97.8)
NRBC # BLD: 0 K/UL (ref 0–0.2)
PLATELET # BLD AUTO: 269 K/UL (ref 150–450)
PMV BLD AUTO: 10.4 FL (ref 9.4–12.3)
POTASSIUM SERPL-SCNC: 4 MMOL/L (ref 3.5–5.1)
RBC # BLD AUTO: 4.21 M/UL (ref 4.05–5.2)
SODIUM SERPL-SCNC: 145 MMOL/L (ref 136–145)
WBC # BLD AUTO: 4.2 K/UL (ref 4.3–11.1)

## 2022-04-20 PROCEDURE — 77030039425 HC BLD LARYNG TRULITE DISP TELE -A: Performed by: REGISTERED NURSE

## 2022-04-20 PROCEDURE — 77030031139 HC SUT VCRL2 J&J -A: Performed by: SPECIALIST

## 2022-04-20 PROCEDURE — 76210000063 HC OR PH I REC FIRST 0.5 HR: Performed by: SPECIALIST

## 2022-04-20 PROCEDURE — 2709999900 HC NON-CHARGEABLE SUPPLY: Performed by: SPECIALIST

## 2022-04-20 PROCEDURE — 77030037088 HC TUBE ENDOTRACH ORAL NSL COVD-A: Performed by: REGISTERED NURSE

## 2022-04-20 PROCEDURE — 77030034479 HC ADH SKN CLSR PRINEO J&J -B: Performed by: SPECIALIST

## 2022-04-20 PROCEDURE — 76210000021 HC REC RM PH II 0.5 TO 1 HR: Performed by: SPECIALIST

## 2022-04-20 PROCEDURE — 74011250636 HC RX REV CODE- 250/636: Performed by: SPECIALIST

## 2022-04-20 PROCEDURE — 74011250636 HC RX REV CODE- 250/636: Performed by: ANESTHESIOLOGY

## 2022-04-20 PROCEDURE — 77030040922 HC BLNKT HYPOTHRM STRY -A: Performed by: REGISTERED NURSE

## 2022-04-20 PROCEDURE — 76010000136 HC OR TIME 4.5 TO 5 HR: Performed by: SPECIALIST

## 2022-04-20 PROCEDURE — 77030040361 HC SLV COMPR DVT MDII -B: Performed by: SPECIALIST

## 2022-04-20 PROCEDURE — 74011250637 HC RX REV CODE- 250/637: Performed by: ANESTHESIOLOGY

## 2022-04-20 PROCEDURE — 76060000040 HC ANESTHESIA 4.5 TO 5 HR: Performed by: SPECIALIST

## 2022-04-20 PROCEDURE — 74011250636 HC RX REV CODE- 250/636: Performed by: REGISTERED NURSE

## 2022-04-20 PROCEDURE — 77030008462 HC STPLR SKN PROX J&J -A: Performed by: SPECIALIST

## 2022-04-20 PROCEDURE — 80048 BASIC METABOLIC PNL TOTAL CA: CPT

## 2022-04-20 PROCEDURE — 77030029372 HC ADH SKN CLSR PRINEO J&J -C: Performed by: SPECIALIST

## 2022-04-20 PROCEDURE — 77030019908 HC STETH ESOPH SIMS -A: Performed by: REGISTERED NURSE

## 2022-04-20 PROCEDURE — 77030040506 HC DRN WND MDII -A: Performed by: SPECIALIST

## 2022-04-20 PROCEDURE — 85027 COMPLETE CBC AUTOMATED: CPT

## 2022-04-20 PROCEDURE — 77030008536 HC TBNG LIPO SUC GRAM -A: Performed by: SPECIALIST

## 2022-04-20 PROCEDURE — 77030040504 HC DRN WND MDII -B: Performed by: SPECIALIST

## 2022-04-20 PROCEDURE — 77030040830 HC CATH URETH FOL MDII -A: Performed by: SPECIALIST

## 2022-04-20 PROCEDURE — 77030008534 HC TBNG LIPOSUC BYRO -B: Performed by: SPECIALIST

## 2022-04-20 PROCEDURE — 74011000250 HC RX REV CODE- 250: Performed by: REGISTERED NURSE

## 2022-04-20 PROCEDURE — 77030033138 HC SUT PGA STRATFX J&J -B: Performed by: SPECIALIST

## 2022-04-20 PROCEDURE — 77030002888 HC SUT CHRMC J&J -A: Performed by: SPECIALIST

## 2022-04-20 RX ORDER — PROPOFOL 10 MG/ML
INJECTION, EMULSION INTRAVENOUS AS NEEDED
Status: DISCONTINUED | OUTPATIENT
Start: 2022-04-20 | End: 2022-04-20 | Stop reason: HOSPADM

## 2022-04-20 RX ORDER — NALOXONE HYDROCHLORIDE 0.4 MG/ML
0.1 INJECTION, SOLUTION INTRAMUSCULAR; INTRAVENOUS; SUBCUTANEOUS AS NEEDED
Status: DISCONTINUED | OUTPATIENT
Start: 2022-04-20 | End: 2022-04-20 | Stop reason: HOSPADM

## 2022-04-20 RX ORDER — GENTAMICIN SULFATE 40 MG/ML
INJECTION, SOLUTION INTRAMUSCULAR; INTRAVENOUS AS NEEDED
Status: DISCONTINUED | OUTPATIENT
Start: 2022-04-20 | End: 2022-04-20 | Stop reason: HOSPADM

## 2022-04-20 RX ORDER — NEOSTIGMINE METHYLSULFATE 1 MG/ML
INJECTION, SOLUTION INTRAVENOUS AS NEEDED
Status: DISCONTINUED | OUTPATIENT
Start: 2022-04-20 | End: 2022-04-20 | Stop reason: HOSPADM

## 2022-04-20 RX ORDER — SODIUM CHLORIDE 9 MG/ML
25 INJECTION, SOLUTION INTRAVENOUS CONTINUOUS
Status: DISCONTINUED | OUTPATIENT
Start: 2022-04-20 | End: 2022-04-20 | Stop reason: HOSPADM

## 2022-04-20 RX ORDER — MIDAZOLAM HYDROCHLORIDE 1 MG/ML
2 INJECTION, SOLUTION INTRAMUSCULAR; INTRAVENOUS
Status: COMPLETED | OUTPATIENT
Start: 2022-04-20 | End: 2022-04-20

## 2022-04-20 RX ORDER — ONDANSETRON 2 MG/ML
INJECTION INTRAMUSCULAR; INTRAVENOUS AS NEEDED
Status: DISCONTINUED | OUTPATIENT
Start: 2022-04-20 | End: 2022-04-20 | Stop reason: HOSPADM

## 2022-04-20 RX ORDER — SODIUM CHLORIDE 0.9 % (FLUSH) 0.9 %
5-40 SYRINGE (ML) INJECTION AS NEEDED
Status: DISCONTINUED | OUTPATIENT
Start: 2022-04-20 | End: 2022-04-20 | Stop reason: HOSPADM

## 2022-04-20 RX ORDER — ZOLPIDEM TARTRATE 5 MG/1
5 TABLET ORAL
COMMUNITY

## 2022-04-20 RX ORDER — ROCURONIUM BROMIDE 10 MG/ML
INJECTION, SOLUTION INTRAVENOUS AS NEEDED
Status: DISCONTINUED | OUTPATIENT
Start: 2022-04-20 | End: 2022-04-20 | Stop reason: HOSPADM

## 2022-04-20 RX ORDER — LIDOCAINE HYDROCHLORIDE 20 MG/ML
INJECTION, SOLUTION EPIDURAL; INFILTRATION; INTRACAUDAL; PERINEURAL AS NEEDED
Status: DISCONTINUED | OUTPATIENT
Start: 2022-04-20 | End: 2022-04-20 | Stop reason: HOSPADM

## 2022-04-20 RX ORDER — EPHEDRINE SULFATE/0.9% NACL/PF 50 MG/5 ML
SYRINGE (ML) INTRAVENOUS AS NEEDED
Status: DISCONTINUED | OUTPATIENT
Start: 2022-04-20 | End: 2022-04-20 | Stop reason: HOSPADM

## 2022-04-20 RX ORDER — SODIUM CHLORIDE 0.9 % (FLUSH) 0.9 %
5-40 SYRINGE (ML) INJECTION EVERY 8 HOURS
Status: DISCONTINUED | OUTPATIENT
Start: 2022-04-20 | End: 2022-04-20 | Stop reason: HOSPADM

## 2022-04-20 RX ORDER — HEPARIN SODIUM 5000 [USP'U]/ML
5000 INJECTION, SOLUTION INTRAVENOUS; SUBCUTANEOUS ONCE
Status: COMPLETED | OUTPATIENT
Start: 2022-04-20 | End: 2022-04-20

## 2022-04-20 RX ORDER — KETOROLAC TROMETHAMINE 30 MG/ML
INJECTION, SOLUTION INTRAMUSCULAR; INTRAVENOUS AS NEEDED
Status: DISCONTINUED | OUTPATIENT
Start: 2022-04-20 | End: 2022-04-20 | Stop reason: HOSPADM

## 2022-04-20 RX ORDER — LIDOCAINE HYDROCHLORIDE 10 MG/ML
0.1 INJECTION INFILTRATION; PERINEURAL AS NEEDED
Status: DISCONTINUED | OUTPATIENT
Start: 2022-04-20 | End: 2022-04-20 | Stop reason: HOSPADM

## 2022-04-20 RX ORDER — GLYCOPYRROLATE 0.2 MG/ML
INJECTION INTRAMUSCULAR; INTRAVENOUS AS NEEDED
Status: DISCONTINUED | OUTPATIENT
Start: 2022-04-20 | End: 2022-04-20 | Stop reason: HOSPADM

## 2022-04-20 RX ORDER — CEFAZOLIN SODIUM/WATER 2 G/20 ML
2 SYRINGE (ML) INTRAVENOUS ONCE
Status: COMPLETED | OUTPATIENT
Start: 2022-04-20 | End: 2022-04-20

## 2022-04-20 RX ORDER — HYDROMORPHONE HYDROCHLORIDE 2 MG/ML
INJECTION, SOLUTION INTRAMUSCULAR; INTRAVENOUS; SUBCUTANEOUS AS NEEDED
Status: DISCONTINUED | OUTPATIENT
Start: 2022-04-20 | End: 2022-04-20 | Stop reason: HOSPADM

## 2022-04-20 RX ORDER — SODIUM CHLORIDE, SODIUM LACTATE, POTASSIUM CHLORIDE, CALCIUM CHLORIDE 600; 310; 30; 20 MG/100ML; MG/100ML; MG/100ML; MG/100ML
100 INJECTION, SOLUTION INTRAVENOUS CONTINUOUS
Status: DISCONTINUED | OUTPATIENT
Start: 2022-04-20 | End: 2022-04-20 | Stop reason: HOSPADM

## 2022-04-20 RX ORDER — FAMOTIDINE 20 MG/1
20 TABLET, FILM COATED ORAL ONCE
Status: COMPLETED | OUTPATIENT
Start: 2022-04-20 | End: 2022-04-20

## 2022-04-20 RX ORDER — FREMANEZUMAB-VFRM 225 MG/1.5ML
225 INJECTION SUBCUTANEOUS
COMMUNITY

## 2022-04-20 RX ORDER — HYDROCODONE BITARTRATE AND ACETAMINOPHEN 7.5; 325 MG/1; MG/1
1 TABLET ORAL AS NEEDED
Status: DISCONTINUED | OUTPATIENT
Start: 2022-04-20 | End: 2022-04-20 | Stop reason: HOSPADM

## 2022-04-20 RX ORDER — HYDROMORPHONE HYDROCHLORIDE 2 MG/ML
0.5 INJECTION, SOLUTION INTRAMUSCULAR; INTRAVENOUS; SUBCUTANEOUS
Status: DISCONTINUED | OUTPATIENT
Start: 2022-04-20 | End: 2022-04-20 | Stop reason: HOSPADM

## 2022-04-20 RX ORDER — SCOLOPAMINE TRANSDERMAL SYSTEM 1 MG/1
1 PATCH, EXTENDED RELEASE TRANSDERMAL ONCE
COMMUNITY
Start: 2022-04-19

## 2022-04-20 RX ORDER — APREPITANT 40 MG/1
40 CAPSULE ORAL ONCE
COMMUNITY
Start: 2022-04-19

## 2022-04-20 RX ORDER — FENTANYL CITRATE 50 UG/ML
INJECTION, SOLUTION INTRAMUSCULAR; INTRAVENOUS AS NEEDED
Status: DISCONTINUED | OUTPATIENT
Start: 2022-04-20 | End: 2022-04-20 | Stop reason: HOSPADM

## 2022-04-20 RX ORDER — DEXAMETHASONE SODIUM PHOSPHATE 4 MG/ML
INJECTION, SOLUTION INTRA-ARTICULAR; INTRALESIONAL; INTRAMUSCULAR; INTRAVENOUS; SOFT TISSUE AS NEEDED
Status: DISCONTINUED | OUTPATIENT
Start: 2022-04-20 | End: 2022-04-20 | Stop reason: HOSPADM

## 2022-04-20 RX ORDER — FENTANYL CITRATE 50 UG/ML
100 INJECTION, SOLUTION INTRAMUSCULAR; INTRAVENOUS ONCE
Status: DISCONTINUED | OUTPATIENT
Start: 2022-04-20 | End: 2022-04-20 | Stop reason: HOSPADM

## 2022-04-20 RX ORDER — DIPHENHYDRAMINE HYDROCHLORIDE 50 MG/ML
INJECTION, SOLUTION INTRAMUSCULAR; INTRAVENOUS AS NEEDED
Status: DISCONTINUED | OUTPATIENT
Start: 2022-04-20 | End: 2022-04-20 | Stop reason: HOSPADM

## 2022-04-20 RX ADMIN — Medication 5 MG: at 08:14

## 2022-04-20 RX ADMIN — PHENYLEPHRINE HYDROCHLORIDE 100 MCG: 10 INJECTION INTRAVENOUS at 11:02

## 2022-04-20 RX ADMIN — HYDROMORPHONE HYDROCHLORIDE 0.6 MG: 2 INJECTION INTRAMUSCULAR; INTRAVENOUS; SUBCUTANEOUS at 08:00

## 2022-04-20 RX ADMIN — Medication 5 MG: at 07:42

## 2022-04-20 RX ADMIN — Medication 10 MG: at 10:59

## 2022-04-20 RX ADMIN — KETOROLAC TROMETHAMINE 30 MG: 30 INJECTION, SOLUTION INTRAMUSCULAR; INTRAVENOUS at 11:17

## 2022-04-20 RX ADMIN — HYDROMORPHONE HYDROCHLORIDE 0.2 MG: 2 INJECTION INTRAMUSCULAR; INTRAVENOUS; SUBCUTANEOUS at 10:06

## 2022-04-20 RX ADMIN — FENTANYL CITRATE 50 MCG: 50 INJECTION INTRAMUSCULAR; INTRAVENOUS at 07:52

## 2022-04-20 RX ADMIN — HYDROMORPHONE HYDROCHLORIDE 0.2 MG: 2 INJECTION INTRAMUSCULAR; INTRAVENOUS; SUBCUTANEOUS at 09:50

## 2022-04-20 RX ADMIN — ONDANSETRON 4 MG: 2 INJECTION INTRAMUSCULAR; INTRAVENOUS at 07:37

## 2022-04-20 RX ADMIN — DIPHENHYDRAMINE HYDROCHLORIDE 25 MG: 50 INJECTION, SOLUTION INTRAMUSCULAR; INTRAVENOUS at 07:59

## 2022-04-20 RX ADMIN — SODIUM CHLORIDE, SODIUM LACTATE, POTASSIUM CHLORIDE, AND CALCIUM CHLORIDE: 600; 310; 30; 20 INJECTION, SOLUTION INTRAVENOUS at 08:00

## 2022-04-20 RX ADMIN — Medication 10 MG: at 10:52

## 2022-04-20 RX ADMIN — GLYCOPYRROLATE 0.4 MG: 0.2 INJECTION, SOLUTION INTRAMUSCULAR; INTRAVENOUS at 09:31

## 2022-04-20 RX ADMIN — Medication 5 MG: at 10:41

## 2022-04-20 RX ADMIN — Medication 5 MG: at 08:20

## 2022-04-20 RX ADMIN — Medication 10 MG: at 07:34

## 2022-04-20 RX ADMIN — FAMOTIDINE 20 MG: 20 TABLET ORAL at 07:00

## 2022-04-20 RX ADMIN — DEXAMETHASONE SODIUM PHOSPHATE 10 MG: 4 INJECTION, SOLUTION INTRAMUSCULAR; INTRAVENOUS at 07:37

## 2022-04-20 RX ADMIN — HEPARIN SODIUM 5000 UNITS: 5000 INJECTION INTRAVENOUS; SUBCUTANEOUS at 07:00

## 2022-04-20 RX ADMIN — HYDROMORPHONE HYDROCHLORIDE 0.4 MG: 2 INJECTION INTRAMUSCULAR; INTRAVENOUS; SUBCUTANEOUS at 11:20

## 2022-04-20 RX ADMIN — PROPOFOL 200 MG: 10 INJECTION, EMULSION INTRAVENOUS at 07:21

## 2022-04-20 RX ADMIN — Medication 2 G: at 11:11

## 2022-04-20 RX ADMIN — Medication 5 MG: at 08:25

## 2022-04-20 RX ADMIN — FENTANYL CITRATE 100 MCG: 50 INJECTION INTRAMUSCULAR; INTRAVENOUS at 07:21

## 2022-04-20 RX ADMIN — Medication 5 MG: at 09:36

## 2022-04-20 RX ADMIN — Medication 3 MG: at 09:31

## 2022-04-20 RX ADMIN — FENTANYL CITRATE 50 MCG: 50 INJECTION INTRAMUSCULAR; INTRAVENOUS at 07:56

## 2022-04-20 RX ADMIN — LIDOCAINE HYDROCHLORIDE 80 MG: 20 INJECTION, SOLUTION EPIDURAL; INFILTRATION; INTRACAUDAL; PERINEURAL at 07:21

## 2022-04-20 RX ADMIN — ROCURONIUM BROMIDE 50 MG: 50 INJECTION, SOLUTION INTRAVENOUS at 07:22

## 2022-04-20 RX ADMIN — ONDANSETRON 4 MG: 2 INJECTION INTRAMUSCULAR; INTRAVENOUS at 11:37

## 2022-04-20 RX ADMIN — Medication 5 MG: at 08:52

## 2022-04-20 RX ADMIN — MIDAZOLAM 2 MG: 1 INJECTION INTRAMUSCULAR; INTRAVENOUS at 07:00

## 2022-04-20 RX ADMIN — Medication 2 G: at 07:26

## 2022-04-20 RX ADMIN — SODIUM CHLORIDE, SODIUM LACTATE, POTASSIUM CHLORIDE, AND CALCIUM CHLORIDE 100 ML/HR: 600; 310; 30; 20 INJECTION, SOLUTION INTRAVENOUS at 06:15

## 2022-04-20 RX ADMIN — Medication 10 MG: at 07:27

## 2022-04-20 RX ADMIN — Medication 10 MG: at 10:31

## 2022-04-20 RX ADMIN — HYDROMORPHONE HYDROCHLORIDE 0.2 MG: 2 INJECTION INTRAMUSCULAR; INTRAVENOUS; SUBCUTANEOUS at 09:58

## 2022-04-20 RX ADMIN — HYDROMORPHONE HYDROCHLORIDE 0.4 MG: 2 INJECTION INTRAMUSCULAR; INTRAVENOUS; SUBCUTANEOUS at 10:01

## 2022-04-20 NOTE — DISCHARGE INSTRUCTIONS
Keep both arms elevated on 3 pillows each, above level of heart at home   She has all Rxs, fu appt in one week    Patient Education        Surgical Drain Care: Care Instructions  Your Care Instructions     After a surgery, fluid may collect inside your body in the surgical area. This makes an infection or other problems more likely. A surgical drain allows the fluid to flow out. The doctor puts a thin, flexible rubber tube into the area of your body where the fluid is likely to collect. The rubber tube carries the fluid outside your body. The most common type of surgical drain carries the fluid into a collection bulb that you empty. This is called a Navjot-Ojeda (HEAVEN) drain. The drain uses suction created by the bulb to pull the fluid from your body into the bulb. The rubber tube will probably be held in place by one or two stitches in your skin. The bulb will probably be attached with a safety pin to your clothes or near the bandage so that it doesn't flip around or pull on the stitches. Another type of drain is called a Penrose drain. This type of drain doesn't have a bulb. Instead, the end of the tube is open. That allows the fluid to drain onto a dressing taped to your skin. The drain may be kept in place next to your skin with a stitch or a safety pin in the tube. When you first get the drain, the fluid will be bloody. It will change color from red to pink to a light yellow or clear as the wound heals and the fluid starts to go away. Your doctor may give you information on when you no longer need the drain and when it will be removed. Follow-up care is a key part of your treatment and safety. Be sure to make and go to all appointments, and call your doctor if you are having problems. It's also a good idea to know your test results and keep a list of the medicines you take. How do you empty the bulb of a Navjot-Ojeda drain? Follow any instructions your doctor gives you.  How often you empty the bulb Problem: Pain  Goal: #Acceptable pain level achieved/maintained at rest using NRS/Faces  This goal is used for patients who can self-report.  Acceptable means the level is at or below the identified comfort/function goal.   Outcome: Outcome Not Met, Continue to Monitor   05/30/18 1446   Pain Evaluation at Rest   Patient's Comfort/Function (Pain) GOAL At Rest 4   Comfort/Function (Pain) SCORE at Rest 6   Pain Evaluation at Rest Pain level unacceptable - add other interventions   Patient has been expressing 10/10 pain on his coccyx. Patient has a pressure injury present which is causing the pain. Writer has been offering pain medications regularly and repositioning patient. Repositioning seems to be working the best. Patient also has heating pad and using pillows to reposition. MD Taylor is informed about uncontrolled pain. Will continue to monitor.        depends on how much fluid is draining. Empty the bulb when it is half full. 1. Wash your hands with soap and water. 2. Take the plug out of the bulb. 3. Empty the bulb. If your doctor asks you to measure the fluid, empty the fluid into a measuring cup, and write down the color and how much you collected. Your doctor will want to know this information. 4. Clean the plug with alcohol. 5. Squeeze the bulb until it is flat. This removes all the air from the bulb. You may need to put the bulb on a table or a counter to flatten it. 6. Keep the bulb flat, and put the plug in. The bulb should stay flat after you put the plug back in. This creates the suction that pulls the fluid into the bulb. 7. Empty the fluid into the toilet. 8. Wash your hands. How do you change the dressing around your surgical drain? You may have a dressing (bandage). The dressing is often made of gauze pads held on with tape. Your doctor will tell you how often to change it. 1. Wash your hands with soap and water. 2. Take off the dressing from around the drain. 3. Clean the drain site and the skin around it with soap and water. Use gauze or a cotton swab. 4. When the site is dry, put on a new dressing. The way your dressing is put on depends on what kind of drain you have. You will get instructions for your type of drain. 5. Wash your hands again with soap and water. Your doctor may ask you to keep track of your dressing changes. Write down the time of day and the amount and color of the fluid on the dressing. How do you help prevent clogs in your surgical drain? Squeezing or \"milking\" the tube of your surgical drain can help prevent clogs so that it drains correctly. Your doctor will tell you when you need to do this. In general, you do this when:  · You see a clot in the tube that prevents fluid from draining. The clot may look like a dark, stringy lining.   · You see fluid leaking around the tube where it goes into the skin.  Follow these steps for milking the tube. 1. Use one hand to hold and pinch the tube where it leaves the skin. 2. With the thumb and first finger of your other hand, pinch the tube just below where you're holding it. 3. Slowly and firmly push your thumb and first finger down the tubing toward the end of the tube. 4. Repeat this as many times as needed to move the clot. If you have a Navjot-Ojeda (HEAVEN) drain, the clot should move down the tube and into the bulb. If you have a Penrose drain, the clot should move into the dressing. When should you call for help? Call your doctor now or seek immediate medical care if:    · You have signs of infection, such as:  ? Increased pain, swelling, warmth, or redness around the area. ? Red streaks leading from the area. ? Pus draining from the area. ? A fever.     · You see a sudden change in the color or smell of the drainage.     · The tube is coming loose where it leaves your skin. Watch closely for changes in your health, and be sure to contact your doctor if:    · You see a lot of fluid around the drain.     · You cannot remove a clot from the tube by milking the tube. Where can you learn more? Go to http://www.gray.com/  Enter K117 in the search box to learn more about \"Surgical Drain Care: Care Instructions. \"  Current as of: July 1, 2021               Content Version: 13.2  © 2006-2022 [employer]. Care instructions adapted under license by [employer] (which disclaims liability or warranty for this information). If you have questions about a medical condition or this instruction, always ask your healthcare professional. [place] [id number] any warranty or liability for your use of this information. CALL YOUR DOCTOR IF     Call your doctor if pain is NOT relieved by medication.      Excessive bleeding that does not stop after holding pressure over the area  · Temperature of 101 degrees F or above  · Excessive redness, swelling or bruising, and/ or green or yellow, smelly discharge from incision      TYPICAL SIDE EFFECTS OF PAIN MEDICATION:     Constipation: Drink lots of fluids. Over the counter stool softener if needed.    Nausea: Take pain medication with food. Call your doctor with persistent nausea. ACTIVITY  · As tolerated and as directed by your doctor. · Bathe or shower as directed by your doctor. DIET  · Day of surgery: Clear liquids until no nausea or vomiting; small portion, light diet [place] foods (ex: baked chicken, plain rice, grits, scrambled eggs, toast). Nothing greasy, fried or spicy today. · Advance to regular diet on second day, unless your doctor orders otherwise. · If nausea and vomiting continues, call your doctor. PAIN  · Take pain medication as directed by your doctor. · DO NOT take aspirin or blood thinners unless directed by your doctor. AFTER ANESTHESIA   · For the first 24 hours: DO NOT Drive, Drink alcoholic beverages, or Make important decisions. · Be aware of dizziness following anesthesia and while taking pain medication. CONTRACEPTIVES  During your procedure you potentially received medication(s) which may reduce the effectiveness of contraceptives. Please consider other forms of contraception for 1 month following your procedure if you are currently using contraceptives as your primary form of birth control. In addition to this, it is recommended you continue your contraceptive as prescribed, unless otherwise instructed by your physician.         PATIENT INSTRUCTIONS:    After general anesthesia or intravenous sedation, for 24 hours or while taking prescription Narcotics:  · Limit your activities  · Do not drive and operate hazardous machinery  · Do not make important personal or business decisions  · Do  not drink alcoholic beverages  · If you have not urinated within 8 hours after discharge, please contact your surgeon on call. *  Please give a list of your current medications to your Primary Care Provider. *  Please update this list whenever your medications are discontinued, doses are      changed, or new medications (including over-the-counter products) are added. *  Please carry medication information at all times in case of emergency situations. Preventing Infection at Home  We care about preventing infection and avoiding the spread of germs - not only when you are in the hospital but also when you return home. When you return home from the hospital, its important to take the following steps to help prevent infection and avoid spreading germs that could infect you and others. Ask everyone in your home to follow these guidelines, too. Clean Your Hands  · Clean your hands whenever your hands are visibly dirty, before you eat, before or after touching your mouth, nose or eyes, and before preparing food. Clean them after contact with body fluids, using the restroom, touching animals or changing diapers. · When washing hands, wet them with warm water and work up a lather. Rub hands for at least 15 seconds, then rinse them and pat them dry with a clean towel or paper towel. · When using hand sanitizers, it should take about 15 seconds to rub your hands dry. If not, you probably didnt apply enough . Cover Your Sneeze or Cough  Germs are released into the air whenever you sneeze or cough. To prevent the spread of infection:  · Turn away from other people before coughing or sneezing. · Cover your mouth or nose with a tissue when you cough or sneeze. Put the tissue in the trash. · If you dont have a tissue, cough or sneeze into your upper sleeve, not your hands. · Always clean your hands after coughing or sneezing. Care for Wounds  Your skin is your bodys first line of defense against germs, but an open wound leaves an easy way for germs to enter your body.  To prevent infection:  · Clean your hands before and after changing wound dressings, and wear gloves to change dressings if recommended by your doctor. · Take special care with IV lines or other devices inserted into the body. If you must touch them, clean your hands first.  · Follow any specific instructions from your doctor to care for your wounds. Contact your doctor if you experience any signs of infection, such as fever or increased redness at the surgical or wound site. Keep a Clean Home  · Clean or wipe commonly touched hard surfaces like door handles, sinks, tabletops, phones and TV remotes. · Use products labeled disinfectant to kill harmful bacteria and viruses. · Use a clean cloth or paper towel to clean and dry surfaces. Wiping surfaces with a dirty dishcloth, sponge or towel will only spread germs. · Never share toothbrushes, ring, drinking glasses, utensils, razor blades, face cloths or bath towels to avoid spreading germs. · Be sure that the linens that you sleep on are clean. · Keep pets away from wounds and wash your hands after touching pets, their toys or bedding. We care about you and your health. Remember, preventing infections is a team effort between you, your family, friends and health care providers. These are general instructions for a healthy lifestyle:    No smoking/ No tobacco products/ Avoid exposure to second hand smoke  Surgeon General's Warning:  Quitting smoking now greatly reduces serious risk to your health. Obesity, smoking, and sedentary lifestyle greatly increases your risk for illness  A healthy diet, regular physical exercise & weight monitoring are important for maintaining a healthy lifestyle  You may be retaining fluid if you have a history of heart failure or if you experience any of the following symptoms:  Weight gain of 3 pounds or more overnight or 5 pounds in a week, increased swelling in our hands or feet or shortness of breath while lying flat in bed. Please call your doctor as soon as you notice any of these symptoms; do not wait until your next office visit. Recognize signs and symptoms of STROKE:  F-face looks uneven  A-arms unable to move or move unevenly  S-speech slurred or non-existent  T-time-call 911 as soon as signs and symptoms begin-DO NOT go       Back to bed or wait to see if you get better-TIME IS BRAIN. Learning About Coronavirus [phone])  Coronavirus [phone]): Overview  What is coronavirus (COVID-19)? The coronavirus disease (COVID-19) is caused by a virus. It is an illness that was first found in [place], [place], in December 2019. It has since spread worldwide. The virus can cause fever, cough, and trouble breathing. In severe cases, it can cause pneumonia and make it hard to breathe without help. It can cause death. Coronaviruses are a large group of viruses. They cause the common cold. They also cause more serious illnesses like Middle East respiratory syndrome (MERS) and severe acute respiratory syndrome (SARS). COVID-19 is caused by a novel coronavirus. That means it's a new type that has not been seen in people before. This virus spreads person-to-person through droplets from coughing and sneezing. It can also spread when you are close to someone who is infected. And it can spread when you touch something that has the virus on it, such as a doorknob or a tabletop. What can you do to protect yourself from coronavirus (COVID-19)? The best way to protect yourself from getting sick is to:  · Avoid areas where there is an outbreak. · Avoid contact with people who may be infected. · Wash your hands often with soap or alcohol-based hand sanitizers. · Avoid crowds and try to stay at least 6 feet away from other people. · Wash your hands often, especially after you cough or sneeze. Use soap and water, and scrub for at least 20 seconds. If soap and water aren't available, use an alcohol-based hand .   · Avoid touching your mouth, nose, and eyes. What can you do to avoid spreading the virus to others? To help avoid spreading the virus to others:  · Cover your mouth with a tissue when you cough or sneeze. Then throw the tissue in the trash. · Use a disinfectant to clean things that you touch often. · Wear a cloth face cover if you have to go to public areas. · Stay home if you are sick or have been exposed to the virus. Don't go to school, work, or public areas. And don't use public transportation, ride-shares, or taxis unless you have no choice. · If you are sick:  ? Leave your home only if you need to get medical care. But call the doctor's office first so they know you're coming. And wear a face cover. ? Wear the face cover whenever you're around other people. It can help stop the spread of the virus when you cough or sneeze. ? Clean and disinfect your home every day. Use household  and disinfectant wipes or sprays. Take special care to clean things that you grab with your hands. These include doorknobs, remote controls, phones, and handles on your refrigerator and microwave. And don't forget countertops, tabletops, bathrooms, and computer keyboards. When to call for help  [id number] anytime you think you may need emergency care. For example, call if:  · You have severe trouble breathing. (You can't talk at all.)  · You have constant chest pain or pressure. · You are severely dizzy or lightheaded. · You are confused or can't think clearly. · Your face and lips have a blue color. · You pass out (lose consciousness) or are very hard to wake up. Call your doctor now if you develop symptoms such as:  · Shortness of breath. · Fever. · Cough. If you need to get care, call ahead to the doctor's office for instructions before you go. Make sure you wear a face cover to prevent exposing other people to the virus. Where can you get the latest information?   The following health organizations are tracking and studying this virus. Their websites contain the most up-to-date information. Jadon Batters also learn what to do if you think you may have been exposed to the virus. · U.S. Centers for Disease Control and Prevention (CDC): The CDC provides updated news about the disease and travel advice. The website also tells you how to prevent the spread of infection. www.cdc.gov  · World Health Organization [facility]): WHO offers information about the virus outbreaks. WHO also has travel advice. www.who.int  Current as of: May 8, 2020               Content Version: 12.5  © 0419-6877 [employer]. Care instructions adapted under license by [employer] (which disclaims liability or warranty for this information). If you have questions about a medical condition or this instruction, always ask your healthcare professional. [address] any warranty or liability for your use of this information.

## 2022-04-20 NOTE — ANESTHESIA POSTPROCEDURE EVALUATION
Procedure(s):  BILATERAL BRACHIOPLASTY.     general    Anesthesia Post Evaluation      Multimodal analgesia: multimodal analgesia used between 6 hours prior to anesthesia start to PACU discharge  Patient location during evaluation: PACU  Patient participation: complete - patient participated  Level of consciousness: awake and alert  Pain management: adequate  Airway patency: patent  Anesthetic complications: no  Cardiovascular status: acceptable  Respiratory status: acceptable  Hydration status: acceptable  Post anesthesia nausea and vomiting:  none  Final Post Anesthesia Temperature Assessment:  Normothermia (36.0-37.5 degrees C)      INITIAL Post-op Vital signs:   Vitals Value Taken Time   /58 04/20/22 1230   Temp 36.7 °C (98 °F) 04/20/22 1207   Pulse 93 04/20/22 1230   Resp 24 04/20/22 1230   SpO2 99 % 04/20/22 1230

## 2022-04-21 NOTE — OP NOTES
[facility]  OPERATIVE REPORT    Name:  Veena Charles  MR#:  [id number]  :  1970  ACCOUNT #:  [de-identified]  DATE OF SERVICE:  2022    PREOPERATIVE DIAGNOSIS:  Excess skin and subcutaneous tissue of bilateral upper arms. POSTOPERATIVE DIAGNOSIS:  Excess skin and subcutaneous tissue of bilateral upper arms. PROCEDURES PERFORMED:  1. Excision of excessive skin and subcutaneous tissue of upper arm, left. 2.  Excision of excessive skin and subcutaneous tissue of upper arm, right. SURGEON:  Gissel Andre MD    ASSISTANT:  Please see RN note. ANESTHESIA:  GETA. COMPLICATIONS:  None. SPECIMENS REMOVED:  none. IMPLANTS:  None. ESTIMATED BLOOD LOSS:  Minimal.    DRAINS:  Two #19 round Lexa drains. FINDINGS:  In-room time 7:10 a.m., out-room time 12 p.m. Surgery cut-time is 7:42. Surgery finish time is 11:50 a.m. (290 minutes in-room time, total time in room). PROCEDURE:  After being marked preoperatively and brought to the OR, successful GETA was obtained, the area was prepped and draped in sterile fashion. Rodriguez catheter was placed as were bilateral SCDs and pillow underneath and padding of all pressure points. The left arm was managed first followed by the right arm and the procedure was the same on either side. A marking pen was used to eliel an incision 2 cm below the bicipital groove coming into the axilla down towards the lateral thoracic roll. Tumescent fluid consisting of plain LR was instilled, 500 mL into the bilateral upper arms in an area near the axilla. A 5 followed by a 4 blunt-tipped Juju III tip cannula was used to suction away the left excess adipose tissue posterolateral and medial aspect of the left arm. The suction was also extended into the axillary area and the posterolateral axillary roll connected to the axilla. Total volume removed from the left side was about 1350 mL of adipose tissue with tumescent.   This incision was made on the incision marked down from the medial epicondyle along the long axis of the arm to the axilla turning in vertically down for about 10 cm. Hemostasis was obtained with electrocautery and dissection was carried down to the superficial fascial system. Posterior dissection was carried out with Allis clamps on the skin edges. A flap-spinning technique was used to measure and eliel the amount of tissue to be cut away. This was done with electrocautery. 2-0 Vicryl was used for deep dermis and anchor points followed by 3-0 Vicryl intermediate between each of these to the point where good dermal closure throughout the length of the incision was carried out. A #15 round Lexa drain was placed in the deep part of the wound prior to complete closure and brought out through elbow without sewing it in.  4-0 Stratafix subcuticular was used for subcuticular closure followed by interrupted 4-0 chromic were appropriate. Prineo dermal adhesive was placed over the full length of the incision with perpendicular 4 cm straps at the axilla for added support of this area. Glue was applied over the Prineo. This was allowed to dry. The arm was covered for the patient's temperature management. Attention was then turned to the right arm and the procedure was performed in the exact same manner. 1100 mL of fat total was aspirated from the smaller right arm and the closure was carried out in the exact same manner. Sterile dressing consisting of 4x4s, ABD pads, Kerlix roll, and a 6-inch Flex-Master Ace wrap was applied. The patient was reversed from anesthesia and left the OR in stable condition. Her arms were elevated on two to three pillows. The patient was extubated with no complications to the procedure.         Lou Easley MD      [id number]  D:  04/20/2022 12:19  T:  04/21/2022 6:35  JOB #:  [id number]

## 2023-01-28 ENCOUNTER — HOSPITAL ENCOUNTER (OUTPATIENT)
Dept: MAMMOGRAPHY | Age: 53
End: 2023-01-28
Payer: COMMERCIAL

## 2023-01-28 DIAGNOSIS — Z12.31 ENCOUNTER FOR SCREENING MAMMOGRAM FOR MALIGNANT NEOPLASM OF BREAST: ICD-10-CM

## 2023-01-28 PROCEDURE — 77063 BREAST TOMOSYNTHESIS BI: CPT

## 2023-06-05 ENCOUNTER — HOSPITAL ENCOUNTER (EMERGENCY)
Age: 53
Discharge: HOME | End: 2023-06-05
Attending: STUDENT IN AN ORGANIZED HEALTH CARE EDUCATION/TRAINING PROGRAM
Payer: COMMERCIAL

## 2023-06-05 VITALS
TEMPERATURE: 97.7 F | WEIGHT: 254 LBS | HEART RATE: 80 BPM | RESPIRATION RATE: 18 BRPM | BODY MASS INDEX: 34.4 KG/M2 | DIASTOLIC BLOOD PRESSURE: 80 MMHG | HEIGHT: 72 IN | OXYGEN SATURATION: 100 % | SYSTOLIC BLOOD PRESSURE: 120 MMHG

## 2023-06-05 DIAGNOSIS — G89.29 CHRONIC PAIN OF RIGHT KNEE: Primary | ICD-10-CM

## 2023-06-05 DIAGNOSIS — M17.0 OSTEOARTHRITIS OF BOTH KNEES, UNSPECIFIED OSTEOARTHRITIS TYPE: ICD-10-CM

## 2023-06-05 DIAGNOSIS — M25.561 CHRONIC PAIN OF RIGHT KNEE: Primary | ICD-10-CM

## 2023-06-05 PROCEDURE — 96372 THER/PROPH/DIAG INJ SC/IM: CPT

## 2023-06-05 PROCEDURE — 6370000000 HC RX 637 (ALT 250 FOR IP): Performed by: STUDENT IN AN ORGANIZED HEALTH CARE EDUCATION/TRAINING PROGRAM

## 2023-06-05 PROCEDURE — 6360000002 HC RX W HCPCS: Performed by: STUDENT IN AN ORGANIZED HEALTH CARE EDUCATION/TRAINING PROGRAM

## 2023-06-05 PROCEDURE — 99284 EMERGENCY DEPT VISIT MOD MDM: CPT

## 2023-06-05 RX ORDER — HYDROCODONE BITARTRATE AND ACETAMINOPHEN 10; 325 MG/1; MG/1
1 TABLET ORAL EVERY 6 HOURS PRN
Qty: 15 TABLET | Refills: 0 | Status: SHIPPED | OUTPATIENT
Start: 2023-06-05 | End: 2023-06-10

## 2023-06-05 RX ORDER — KETOROLAC TROMETHAMINE 15 MG/ML
15 INJECTION, SOLUTION INTRAMUSCULAR; INTRAVENOUS ONCE
Status: COMPLETED | OUTPATIENT
Start: 2023-06-05 | End: 2023-06-05

## 2023-06-05 RX ORDER — HYDROCODONE BITARTRATE AND ACETAMINOPHEN 10; 325 MG/1; MG/1
1 TABLET ORAL
Status: COMPLETED | OUTPATIENT
Start: 2023-06-05 | End: 2023-06-05

## 2023-06-05 RX ADMIN — HYDROCODONE BITARTRATE AND ACETAMINOPHEN 1 TABLET: 10; 325 TABLET ORAL at 05:19

## 2023-06-05 RX ADMIN — KETOROLAC TROMETHAMINE 15 MG: 15 INJECTION, SOLUTION INTRAMUSCULAR; INTRAVENOUS at 05:20

## 2023-06-05 ASSESSMENT — PAIN SCALES - GENERAL
PAINLEVEL_OUTOF10: 10

## 2023-06-05 ASSESSMENT — PAIN DESCRIPTION - LOCATION: LOCATION: KNEE

## 2023-06-05 NOTE — ED NOTES

## 2023-06-05 NOTE — ED PROVIDER NOTES
Pleasant and cooperative. Procedures   Procedures    [facility]   Labs Reviewed - No data to display  Medications   HYDROcodone-acetaminophen (NORCO)  MG per tablet 1 tablet (1 tablet Oral Given 6/5/23 0519)   ketorolac (TORADOL) injection 15 mg (15 mg IntraMUSCular Given 6/5/23 0520)     No orders to display     Voice dictation software was used during the making of this note. This software is not perfect and grammatical and other typographical errors may be present. This note has not been completely proofread for errors.      Lazara Duran MD  06/05/23 5605

## 2023-06-05 NOTE — ED TRIAGE NOTES
Patient comes to ED w/family needing w/c to get to room due to pain in right knee. Patient states both knees hurt but the right knee hurts 10/10 and left knee is 3/10. Patient has been getting cortisone shots in the right knee and the pain has still been constant but tonight it hurt more than normal.  Patient took 50mg tramadol about 12:30am     Cortisone shot in right knee 2 week ago approx.

## 2023-06-05 NOTE — DISCHARGE INSTRUCTIONS
You may take the medication as directed. Please follow-up with your orthopedic surgeon in their clinic in the next 1 to 2 weeks. Return to the ER for any new or worsening symptoms or if you develop fevers.

## 2024-03-28 NOTE — PROGRESS NOTES
Right knee osteoarthritis  -Status post intra-articular steroid injection  -Status post intra-articular viscosupplementation injection  -Discontinue Celebrex   -Increase diclofenac to 75 mg twice daily. Spent time discussing general side effects concerns with medication. Also spent time discussing avoidance of additional NSAIDs.  -Status post referral to [facility] for second opinion regarding knee pain.  Cervical stenosis  -Scheduled for repeat interlaminar C5-6 NORBERT  -Referral neurosurgery for discussion involving possible surgical intervention  Cervical spondylosis without myelopathy  -Potential for medial branch blocks/RFA pending response to NORBERT  -Scheduled for left cervical paraspinous and trapezius muscle trigger point junctions  Fibromyalgia  -Patient currently receiving antiepileptic, tramadol, anti-inflammatories, antidepressant from outside physician  Chronic pain syndrome  -Encourage continuation of active healthy lifestyle     Previous Encounter on 11/13/23  Patient returns for follow-up and treatment regarding chronic neck pain. She continues to endorse over 3 months of over 50% improvement of pain and function following cervical and low back injections thus far. Unfortunate she has begun to experience more upper extremity pain and although she is not completely limited she is seeing that there is a trend downwards as far as her functionality goes. We discussed repeating injection which she expressed desire to move forward with. She is taken diclofenac and reports initial significant improvement but the last several days there is a lack of benefit. We therefore increased her dosing.       Previous interventions:  Procedure Date Results   Cervical trigger point injections 3 months ago Limited benefit   With sounds like cervical NORBERT Around a year ago Close to 3 months right around 50% improvement   Cervical NORBERT C5-6 8/24/2023 50%   Lumbar NORBERT L4-5 9/21/2023 50%   Cervical NORBERT C5/6 11/15/23          Previous

## 2024-04-03 ENCOUNTER — OFFICE VISIT (OUTPATIENT)
Age: 54
End: 2024-04-03
Payer: COMMERCIAL

## 2024-04-03 DIAGNOSIS — M48.02 CERVICAL STENOSIS OF SPINAL CANAL: Primary | ICD-10-CM

## 2024-04-03 DIAGNOSIS — M25.561 CHRONIC PAIN OF RIGHT KNEE: ICD-10-CM

## 2024-04-03 DIAGNOSIS — G89.29 CHRONIC PAIN OF RIGHT KNEE: ICD-10-CM

## 2024-04-03 PROCEDURE — 99213 OFFICE O/P EST LOW 20 MIN: CPT | Performed by: ANESTHESIOLOGY

## 2024-04-03 RX ORDER — DICLOFENAC SODIUM 75 MG/1
75 TABLET, DELAYED RELEASE ORAL 2 TIMES DAILY
Qty: 60 TABLET | Refills: 3 | Status: SHIPPED | OUTPATIENT
Start: 2024-04-03

## 2024-04-03 NOTE — PROGRESS NOTES
Chronic Pain Consult Note      Plan:     A comprehensive pain management plan may consist of the following: Testing, Therapy, Medications, Interventions, Consults, and Follow up.    Right knee osteoarthritis  -Status post intra-articular steroid injection  -Status post intra-articular viscosupplementation injection  -Discontinue Celebrex   -Continue diclofenac to 75 mg twice daily. Spent time discussing general side effects concerns with medication. Also spent time discussing avoidance of additional NSAIDs.  -Referral to [facility] for second opinion regarding intervention.  -Patient reports history of PRP injections  Cervical stenosis  -Scheduled for repeat interlaminar C5-6 NORBERT  -Referral neurosurgery for discussion involving possible surgical intervention  Cervical spondylosis without myelopathy  -Potential for medial branch blocks/RFA pending response to NORBERT  -Status post left cervical paraspinous and trapezius muscle trigger point junctions  Fibromyalgia  -Patient currently receiving antiepileptic, tramadol, anti-inflammatories, antidepressant from outside physician  Chronic pain syndrome  -Encourage continuation of active healthy lifestyle    General Recommendations: The pain condition that the patient suffers from is best treated with a multidisciplinary approach that involves an increase in physical activity to prevent de-conditioning and worsening of the pain cycle, as well as psychological counseling (formal and/or informal) to address the co morbid psychological effects of pain. Treatment will often involve judicious use of pain medications and interventional procedures to decrease the pain, allowing the patient to participate in the physical activity that will ultimately produce long-lasting pain reductions. The goal of the multidisciplinary approach is to return the patient to a higher level of overall function and to restore their ability to perform activities of daily living.      Referring

## 2024-04-11 ENCOUNTER — OFFICE VISIT (OUTPATIENT)
Dept: ORTHOPEDIC SURGERY | Age: 54
End: 2024-04-11
Payer: COMMERCIAL

## 2024-04-11 DIAGNOSIS — M48.02 CERVICAL STENOSIS OF SPINAL CANAL: Primary | ICD-10-CM

## 2024-04-11 PROCEDURE — 62321 NJX INTERLAMINAR CRV/THRC: CPT | Performed by: ANESTHESIOLOGY

## 2024-04-11 RX ORDER — BETAMETHASONE SODIUM PHOSPHATE AND BETAMETHASONE ACETATE 3; 3 MG/ML; MG/ML
30 INJECTION, SUSPENSION INTRA-ARTICULAR; INTRALESIONAL; INTRAMUSCULAR; SOFT TISSUE ONCE
Status: COMPLETED | OUTPATIENT
Start: 2024-04-11 | End: 2024-04-11

## 2024-04-11 RX ADMIN — BETAMETHASONE SODIUM PHOSPHATE AND BETAMETHASONE ACETATE 30 MG: 3; 3 INJECTION, SUSPENSION INTRA-ARTICULAR; INTRALESIONAL; INTRAMUSCULAR; SOFT TISSUE at 08:01

## 2024-04-11 NOTE — PROGRESS NOTES
NAME: Cecilia Fallon     ID:[id number]     :1970    Location: [facility]    Procedure: Cerivcal Epidural Steroid Injection Under Fluoroscopic Imaging    Pre-op Diagnosis: 1. Cervical Degenerative Disc Disease. 2. Cervical Radicular Pain    Post-op Diagnosis: Same    Anesthesia: Local only     Complications: None    After confirming written and informed consent and discussing the risk, benefits and alternatives for the procedure, the patient had the correct site marked by the physician performing the procedure. The specific risks of bleeding and infection were discussed. The patient was taken to the fluoroscopy suite.    The patient was then placed in the prone position. A pulse oximeter was placed, and verbal and visual monitoring were maintained throughout the procedure. The skin overlying the cervical and thoracic spine was then prepped with chlorhexidine gluconate and a sterile drape was aced. Appropriate sterile attire was worn,  including sterile gloves. A time out was then performed involving the physician, radiation technologist and the patient.    Fluoroscopy was then used to identify the anatomy of the cervical and thoracic spine. The skin overlying the  C5/6 interspace was then anesthetized with 3 ml of 1% lidocaine using a 25G 1.5 inch needle. Next, an 20 G 9 cm Tuohy needle was then advanced through the skin, underlying subcutaneous fat and into the supraspinous ligament using intermittent fluoroscopy. After contacting ligament, a contralateral oblique view of the interspace was obtained with fluoroscopy to determine the depth of the Tuohy needle. Proper medial-lateral location of the needle was obtained using anterior-posterior fluoroscopic views. A loss of resistance to air technique was then used to traverse the ligamentum flavum into the epidural space. 2 ml of Omnipaque-240 was then used to confirm the location of the needle tip in the epidural space, excluding vascular or intrathecal placement

## 2024-05-13 ENCOUNTER — OFFICE VISIT (OUTPATIENT)
Dept: ORTHOPEDIC SURGERY | Age: 54
End: 2024-05-13
Payer: COMMERCIAL

## 2024-05-13 VITALS — BODY MASS INDEX: 29.53 KG/M2 | HEIGHT: 72 IN | WEIGHT: 218 LBS

## 2024-05-13 DIAGNOSIS — M17.11 ARTHRITIS OF RIGHT KNEE: Primary | ICD-10-CM

## 2024-05-13 PROCEDURE — 99204 OFFICE O/P NEW MOD 45 MIN: CPT | Performed by: ORTHOPAEDIC SURGERY

## 2024-05-13 RX ORDER — APREPITANT 40 MG/1
40 CAPSULE ORAL ONCE
COMMUNITY
Start: 2022-04-19

## 2024-05-13 RX ORDER — ALPRAZOLAM 1 MG/1
TABLET ORAL
COMMUNITY
Start: 2022-12-22

## 2024-05-13 NOTE — PROGRESS NOTES
Name: Cecilia Fallon  YOB: 1970  Gender: female  MRN: [id number]      CC: Knee Pain (R)       HPI: Cecilia Fallon is a 54 y.o. female who presents with Knee Pain (R)  . She is a new patient today who is here today for her right knee.  This is a second opinion.  Her knee has been bothering her for 1.5 years. Has tried gel shots, prp, and cortisone. None of them helped. No surgery on this knee. Gradual onset of pain. Pain is all around her knee. Knee swells often. Walking, going up and down stairs. Lying at night hurts her. No physical therapy. Has tried pool, hot tub, and gym programs.           ROS/Meds/PSH/PMH/FH/SH: I personally reviewed the patients standard intake form.  Below are the pertinents    Tobacco:  reports that she has never smoked. She has never used smokeless tobacco.  Diabetes: none  Other: Fibromyalgia    Physical Examination:  General: no acute distress  Lungs: breathing easily  CV: regular rhythm by pulse  Right Knee: Tenderness palpation of the lateral joint line pain and crepitus with patellofemoral capture and manipulation.  Maintains full range of motion but has grinding and discomfort.  She has genu valgum alignment with tenderness palpation of the lateral joint line.  This is passively correctable alignment to neutral.  Negative Fely's just generalized pain.  Ligamentously stable x 4.      Imaging:   I reviewed plain radiographs of her right knee from February 26, 2024 which demonstrate mild joint space narrowing and early osteophyte formation of the medial compartment    I reviewed an MRI scan from March 15, 2024 which shows significant grade IV chondromalacia of the lateral femoral condyle with changes in the tibia as well as well as some early subchondral cystic changes.  Also kissing lesions in the patellofemoral compartment with subchondral edema on the trochlea and patella with full-thickness loss.  All imaging interpreted by myself Ray Fernandez MD

## 2024-05-31 ENCOUNTER — OFFICE VISIT (OUTPATIENT)
Dept: ORTHOPEDIC SURGERY | Age: 54
End: 2024-05-31

## 2024-05-31 DIAGNOSIS — M17.11 ARTHRITIS OF RIGHT KNEE: Primary | ICD-10-CM

## 2024-05-31 NOTE — PROGRESS NOTES
Patient was fitted and instructed by Allen Adan on a Knee Orthosis Custom Single Upright Knee Brace for the right knee. Patient read and signed documenting they understand and agree to [facility]'s current DME return policy.

## 2024-06-13 ENCOUNTER — OFFICE VISIT (OUTPATIENT)
Age: 54
End: 2024-06-13
Payer: COMMERCIAL

## 2024-06-13 DIAGNOSIS — M25.561 CHRONIC PAIN OF BOTH KNEES: ICD-10-CM

## 2024-06-13 DIAGNOSIS — M25.561 CHRONIC PAIN OF RIGHT KNEE: Primary | ICD-10-CM

## 2024-06-13 DIAGNOSIS — G89.29 CHRONIC PAIN OF RIGHT KNEE: Primary | ICD-10-CM

## 2024-06-13 DIAGNOSIS — M25.562 CHRONIC PAIN OF BOTH KNEES: ICD-10-CM

## 2024-06-13 DIAGNOSIS — M54.16 LUMBAR RADICULOPATHY: ICD-10-CM

## 2024-06-13 DIAGNOSIS — G89.29 CHRONIC PAIN OF BOTH KNEES: ICD-10-CM

## 2024-06-13 PROCEDURE — 99213 OFFICE O/P EST LOW 20 MIN: CPT | Performed by: ANESTHESIOLOGY

## 2024-06-13 RX ORDER — TRAMADOL HYDROCHLORIDE 50 MG/1
100 TABLET ORAL EVERY 8 HOURS PRN
COMMUNITY
Start: 2024-05-02

## 2024-06-13 RX ORDER — METHOCARBAMOL 500 MG/1
TABLET, FILM COATED ORAL
COMMUNITY

## 2024-06-13 RX ORDER — FERROUS SULFATE 325(65) MG
1 TABLET ORAL 2 TIMES DAILY
COMMUNITY
Start: 2024-03-27

## 2024-06-13 RX ORDER — FUROSEMIDE 20 MG/1
1 TABLET ORAL EVERY MORNING
COMMUNITY

## 2024-06-13 RX ORDER — PREGABALIN 150 MG/1
150 CAPSULE ORAL 2 TIMES DAILY
COMMUNITY
Start: 2024-05-02

## 2024-06-13 RX ORDER — LAMOTRIGINE 100 MG/1
TABLET, EXTENDED RELEASE ORAL
COMMUNITY
Start: 2023-10-16

## 2024-06-13 NOTE — PROGRESS NOTES
Limited benefit   Tylenol Limited benefit   Antiepileptics Lyrica Topamax Lamictal Limited benefit   Antidepressants Nortriptyline Elavil Cymbalta Limited benefit   Relaxants Zanaflex Robaxin Skelaxin Flexeril Limited benefit   Topicals/transdermal patches Lidocaine, diclofenac, over-the-counter Limited benefit   Narcotics Tramadol morphine codeine methadone oxycodone hydrocodone Dilaudid Mild to moderate benefit with side effects       Previous tests/studies:  Study Date Results   Cervical MRI 1/24/2023 FINDINGS:  . Spinal cord: No abnormal intramedullary signal.    . Vertebrae: Vertebral body heights are maintained.    . Alignment: No malalignment.    Craniocervical junction: Within normal limits.    . C2-3: Small posterior disc osteophyte complex. Mild bilateral facet arthropathy. No significant spinal canal or foraminal stenosis.    . C3-4: Central disc osteophyte complex. Spinal canal narrows to a minimum of 10 mm AP. Mild bilateral facet arthropathy. No foraminal stenosis.    . C4-5: Mild bilateral facet arthropathy. Small posterior disc osteophyte complex. No significant spinal canal or foraminal stenosis.    . C5-6: Posterior disc osteophyte complex with mild CSF effacement. Spinal canal narrows to a minimum of 10 mm AP. Mild bilateral facet arthropathy. Mild left foraminal stenosis.    . C6-7: Posterior disc osteophyte complex partial CSF effacement. Mild ligament flavum thickening posteriorly. Spinal canal narrows to a minimum of 9 mm AP. Mild bilateral foraminal stenosis secondary to facet arthropathy and uncovertebral spurring.    . C7-T1: No significant spinal canal or foraminal stenosis.    Upper thoracic spine: Within normal limits.      IMPRESSION:  1. Multilevel cervical spondylosis as above, most pronounced at C6-7 where there is mild spinal canal stenosis and mild bilateral foraminal narrowing.  2. No cord signal abnormality.         Previous therapies:  Type of Therapy Date Results   Physical

## 2024-06-28 DIAGNOSIS — M48.02 CERVICAL STENOSIS OF SPINAL CANAL: ICD-10-CM

## 2024-06-28 RX ORDER — DICLOFENAC SODIUM 75 MG/1
75 TABLET, DELAYED RELEASE ORAL 2 TIMES DAILY
Qty: 60 TABLET | Refills: 3 | Status: SHIPPED | OUTPATIENT
Start: 2024-06-28

## 2024-08-05 NOTE — PROGRESS NOTES
Procedure Date: 2024      Location: [facility]       Procedure: L4/5 IL NORBERT       Time Out performed prior to start of the procedure:       Harry Rudd M.D.  performed the following reviews on Cecilia Fallon 1970 prior to the start of the procedure:       patient was identified by name and     agreement on procedure being performed was verified   risks and benefits explained to patient by the provider  procedure site verified as n/a  patient was positioned for comfort   consent signed and verified for procedure       Time:  10:53 AM        Procedure performed by:   Harry Rudd M.D.       Patient assisted by:   Felicia Garduno MA

## 2024-08-06 ENCOUNTER — OFFICE VISIT (OUTPATIENT)
Dept: ORTHOPEDIC SURGERY | Age: 54
End: 2024-08-06
Payer: COMMERCIAL

## 2024-08-06 DIAGNOSIS — M54.16 LUMBAR RADICULOPATHY: Primary | ICD-10-CM

## 2024-08-06 PROCEDURE — 62323 NJX INTERLAMINAR LMBR/SAC: CPT | Performed by: ANESTHESIOLOGY

## 2024-08-06 RX ORDER — BETAMETHASONE SODIUM PHOSPHATE AND BETAMETHASONE ACETATE 3; 3 MG/ML; MG/ML
30 INJECTION, SUSPENSION INTRA-ARTICULAR; INTRALESIONAL; INTRAMUSCULAR; SOFT TISSUE ONCE
Status: COMPLETED | OUTPATIENT
Start: 2024-08-06 | End: 2024-08-06

## 2024-08-06 RX ADMIN — BETAMETHASONE SODIUM PHOSPHATE AND BETAMETHASONE ACETATE 30 MG: 3; 3 INJECTION, SUSPENSION INTRA-ARTICULAR; INTRALESIONAL; INTRAMUSCULAR; SOFT TISSUE at 15:23

## 2024-08-06 NOTE — PROGRESS NOTES
NAME: Cecilia Fallon   ID:[id number]   :1970    Location: [facility]    Procedure: Lumbar Epidural Steroid Injection Under Fluoroscopic Imaging    Pre-op Diagnosis: 1. Lumbar Spinal Stenosis . 2. Lumbar Degenerative Disc Disease. 3. Lumbar Radicular Pain    Post-op Diagnosis: Same     Anesthesia: Local only     Complications: None      After confirming written and informed consent and discussing the risk, benefits and alternatives for the procedure, the patient had the correct site marked by the physician performing the procedure. The specific risks of bleeding and infection were discussed. The patient was taken to the fluoroscopy suite. A pulse oximeter was placed, and verbal and visual monitoring were maintained throughout the procedure.    The patient was then placed in the prone position. The skin overlying the lumbo-sacral spine was then prepped with chlorhexidine gluconate and a sterile drape was placed. Appropriate sterile attire was worn, including sterile gloves. A time out was then performed involving the physician, radiation technologist and the patient.    Fluoroscopy was then used to identify the anatomy of the lumbo-sacral spine. The skin overlying the L4/5 interspace was then anesthetized with 3 ml of 1% lidocaine using a 25G 1.5 inch needle. Next, an 20 G 9 cm Tuohy needle was then advanced through the skin, underlying subcutaneous fat and into the supraspinous ligament using intermittent fluoroscopy. After contacting ligament, a contralateral oblique fluoroscopic view of the interspace was obtained to determine the depth of the Tuohy needle. Proper medial-lateral location of the needle was obtained using anterior-posterior fluoroscopic views. A loss of resistance to air technique was then used to traverse the ligamentum flavum into the epidural space. 2 ml of Omnipaque-240 was then used to confirm the location of the needle tip in the epidural space, excluding vascular or intrathecal placement of

## 2024-09-03 ENCOUNTER — NURSE ONLY (OUTPATIENT)
Age: 54
End: 2024-09-03
Payer: COMMERCIAL

## 2024-09-03 DIAGNOSIS — G89.29 CHRONIC PAIN OF BOTH KNEES: Primary | ICD-10-CM

## 2024-09-03 DIAGNOSIS — M25.561 CHRONIC PAIN OF BOTH KNEES: Primary | ICD-10-CM

## 2024-09-03 DIAGNOSIS — M25.562 PAIN IN BOTH KNEES, UNSPECIFIED CHRONICITY: ICD-10-CM

## 2024-09-03 DIAGNOSIS — M25.562 CHRONIC PAIN OF BOTH KNEES: Primary | ICD-10-CM

## 2024-09-03 DIAGNOSIS — M25.561 PAIN IN BOTH KNEES, UNSPECIFIED CHRONICITY: ICD-10-CM

## 2024-09-03 PROCEDURE — 20610 DRAIN/INJ JOINT/BURSA W/O US: CPT | Performed by: PHYSICIAN ASSISTANT

## 2024-09-03 RX ORDER — METHYLPREDNISOLONE ACETATE 40 MG/ML
40 INJECTION, SUSPENSION INTRA-ARTICULAR; INTRALESIONAL; INTRAMUSCULAR; SOFT TISSUE ONCE
Status: COMPLETED | OUTPATIENT
Start: 2024-09-03 | End: 2024-09-03

## 2024-09-03 RX ADMIN — METHYLPREDNISOLONE ACETATE 40 MG: 40 INJECTION, SUSPENSION INTRA-ARTICULAR; INTRALESIONAL; INTRAMUSCULAR; SOFT TISSUE at 11:46

## 2024-09-03 RX ADMIN — Medication 10 ML: at 11:46

## 2024-09-03 NOTE — PROGRESS NOTES
Error    
Procedure Date: September 3, 2024      Location: [facility]       Procedure: Bilateral Knee Steroid Injection       Time Out performed prior to start of the procedure:       Allegra Kothari PA-C performed the following reviews on Cecilia Fallon 1970 prior to the start of the procedure:       patient was identified by name and     agreement on procedure being performed was verified   risks and benefits explained to patient by the provider  procedure site verified as Bilateral  patient was positioned for comfort   consent signed and verified for procedure       Time:  11:15 AM        Procedure performed by:   Allegra Kothari PA-C      Patient assisted by:   WILBER ROMERO MA   
Pressure was held and patient was observed for 10 minutes before discharge.   Complications: None noted   Post-Procedure: The patient tolerated the procedure well. Post procedure instructions were explained and given to the patient. The patient was discharged home in stable condition. The patient will follow- up with me in the next 2-4 weeks. The patient is invited to contact us at any time should they have any questions and/ or problems .    Patient asked if she could re-trial visco. Her last visco was prior to Christmas 2023. She had Durolane at that time with minimal benefit. She has not tried Monovisc, therefore, we will see if we can get Monovisc approved.

## 2024-10-17 ENCOUNTER — OFFICE VISIT (OUTPATIENT)
Age: 54
End: 2024-10-17
Payer: COMMERCIAL

## 2024-10-17 DIAGNOSIS — G89.29 CHRONIC PAIN OF BOTH KNEES: ICD-10-CM

## 2024-10-17 DIAGNOSIS — M25.562 CHRONIC PAIN OF BOTH KNEES: ICD-10-CM

## 2024-10-17 DIAGNOSIS — M48.02 CERVICAL STENOSIS OF SPINAL CANAL: Primary | ICD-10-CM

## 2024-10-17 DIAGNOSIS — M25.561 CHRONIC PAIN OF BOTH KNEES: ICD-10-CM

## 2024-10-17 PROCEDURE — 99214 OFFICE O/P EST MOD 30 MIN: CPT | Performed by: PHYSICIAN ASSISTANT

## 2024-10-17 PROCEDURE — 20610 DRAIN/INJ JOINT/BURSA W/O US: CPT | Performed by: PHYSICIAN ASSISTANT

## 2024-10-17 ASSESSMENT — ENCOUNTER SYMPTOMS
EYES NEGATIVE: 1
SHORTNESS OF BREATH: 0
ALLERGIC/IMMUNOLOGIC NEGATIVE: 1
ABDOMINAL PAIN: 0

## 2024-10-17 NOTE — PROGRESS NOTES
Joint Aspiration/Injection    Date/Time: 10/17/2024 11:54 AM    Performed by: Allegra Kothari PA  Authorized by: Allegra Kothari PA    Consent:     Consent obtained:  Written    Consent given by:  Patient    Risks, benefits, and alternatives were discussed: yes      Risks discussed:  Bleeding, infection and pain    Alternatives discussed:  Delayed treatment, alternative treatment and observation  Universal protocol:     Procedure explained and questions answered to patient or proxy's satisfaction: yes      Relevant documents present and verified: yes      Site/side marked: yes      Immediately prior to procedure, a time out was called: yes      Patient identity confirmed:  Verbally with patient  Location:     Location:  Knee    Knee joint: BILATERAL.  Anesthesia:     Anesthesia method:  None  Procedure details:     Needle gauge:  22 G    Ultrasound guidance: no      Approach:  Lateral    Steroid injected: no (MONOVISC x 2)      Specimen collected: no    Post-procedure details:     Dressing:  Adhesive bandage    Procedure completion:  Tolerated well, no immediate complications    
Procedure Date: 2024      Location: [facility]       Procedure: BILATERAL MONOVISC INJECTIONS       Time Out performed prior to start of the procedure:       Harry Rudd M.D.  performed the following reviews on Cecilia Fallon 1970 prior to the start of the procedure:       patient was identified by name and     agreement on procedure being performed was verified   risks and benefits explained to patient by the provider  procedure site verified as Bilateral  patient was positioned for comfort   consent signed and verified for procedure       Time:  11:15 AM        Procedure performed by:   Harry Rudd M.D.       Patient assisted by:   WILBER ROMERO MA   
  Constitutional:  Negative for chills, fatigue, fever and unexpected weight change.   HENT:  Negative for congestion and ear pain.    Eyes: Negative.    Respiratory:  Negative for shortness of breath.    Cardiovascular:  Negative for chest pain.   Gastrointestinal:  Negative for abdominal pain.   Endocrine: Negative.    Genitourinary: Negative.    Skin:  Negative for rash.   Allergic/Immunologic: Negative.    Neurological:  Negative for dizziness.   Hematological: Negative.    Psychiatric/Behavioral: Negative.           All 11 systems reviewed and were negative.          The [place] database for controlled substance prescription monitoring was reviewed.    Date: October 17, 2024  Patient Name: Cecilia Fallon  MRN:[id number]  PCP: Pino Burgos personally performed the HPI, exam, and assessment/plan, verified the documentation and approve it is updated, accurate, and complete. Parts or the entirety of this document were transcribed utilizing voice recognition software. Transcription errors may be present.    Allegra Kothari PA-C

## 2024-11-12 ENCOUNTER — OFFICE VISIT (OUTPATIENT)
Dept: ORTHOPEDIC SURGERY | Age: 54
End: 2024-11-12
Payer: COMMERCIAL

## 2024-11-12 DIAGNOSIS — M48.02 CERVICAL STENOSIS OF SPINAL CANAL: Primary | ICD-10-CM

## 2024-11-12 DIAGNOSIS — M54.12 CERVICAL RADICULOPATHY: ICD-10-CM

## 2024-11-12 PROCEDURE — 62321 NJX INTERLAMINAR CRV/THRC: CPT | Performed by: ANESTHESIOLOGY

## 2024-11-12 RX ORDER — BETAMETHASONE SODIUM PHOSPHATE AND BETAMETHASONE ACETATE 3; 3 MG/ML; MG/ML
30 INJECTION, SUSPENSION INTRA-ARTICULAR; INTRALESIONAL; INTRAMUSCULAR; SOFT TISSUE ONCE
Status: COMPLETED | OUTPATIENT
Start: 2024-11-12 | End: 2024-11-12

## 2024-11-12 RX ADMIN — BETAMETHASONE SODIUM PHOSPHATE AND BETAMETHASONE ACETATE 30 MG: 3; 3 INJECTION, SUSPENSION INTRA-ARTICULAR; INTRALESIONAL; INTRAMUSCULAR; SOFT TISSUE at 08:27

## 2024-11-12 NOTE — PROGRESS NOTES
NAME: Cecilia Fallon     ID:[id number]     :1970    Location: [facility]    Procedure: Cerivcal Epidural Steroid Injection Under Fluoroscopic Imaging    Pre-op Diagnosis: 1. Cervical Degenerative Disc Disease. 2. Cervical Radicular Pain    Post-op Diagnosis: Same    Anesthesia: Local only     Complications: None    After confirming written and informed consent and discussing the risk, benefits and alternatives for the procedure, the patient had the correct site marked by the physician performing the procedure. The specific risks of bleeding and infection were discussed. The patient was taken to the fluoroscopy suite.    The patient was then placed in the prone position. A pulse oximeter was placed, and verbal and visual monitoring were maintained throughout the procedure. The skin overlying the cervical and thoracic spine was then prepped with chlorhexidine gluconate and a sterile drape was aced. Appropriate sterile attire was worn,  including sterile gloves. A time out was then performed involving the physician, radiation technologist and the patient.    Fluoroscopy was then used to identify the anatomy of the cervical and thoracic spine. The skin overlying the C5/6 interspace was then anesthetized with 3 ml of 1% lidocaine using a 25G 1.5 inch needle. Next, an 20 G 9 cm Tuohy needle was then advanced through the skin, underlying subcutaneous fat and into the supraspinous ligament using intermittent fluoroscopy. After contacting ligament, a contralateral oblique view of the interspace was obtained with fluoroscopy to determine the depth of the Tuohy needle. Proper medial-lateral location of the needle was obtained using anterior-posterior fluoroscopic views. A loss of resistance to air technique was then used to traverse the ligamentum flavum into the epidural space. 2 ml of Omnipaque-240 was then used to confirm the location of the needle tip in the epidural space, excluding vascular or intrathecal placement of 
Procedure Date: 24      Location: [facility]       Procedure: C5/6 IL NORBERT       Time Out performed prior to start of the procedure:       Harry Rudd M.D.  performed the following reviews on Cecilia Fallon 1970 prior to the start of the procedure:       patient was identified by name and     agreement on procedure being performed was verified   risks and benefits explained to patient by the provider  procedure site verified as n/a  patient was positioned for comfort   consent signed and verified for procedure       Time:  8:12 AM        Procedure performed by:   Harry Rudd M.D.       Patient assisted by:   Felicia Garduno MA     
"Pt arrives with c/o 2 days of cough, ear pain, sore throat, chills and today woke with c/o SOB and increasing sore throat with difficulty swallowing due to the pain." Pt has a hx of HTN, TIA and COPD, states she feels a lot of pressure in her chest, has had coughing without phlegm, reports yellow/green mucous. Pt takes Advair and singulair for her COPD daily. Pt given neb treatement and prednisone. Will continue to monitor.

## 2024-12-11 ENCOUNTER — OFFICE VISIT (OUTPATIENT)
Age: 54
End: 2024-12-11
Payer: COMMERCIAL

## 2024-12-11 DIAGNOSIS — M54.16 LUMBAR RADICULOPATHY: Primary | ICD-10-CM

## 2024-12-11 PROCEDURE — 99213 OFFICE O/P EST LOW 20 MIN: CPT | Performed by: ANESTHESIOLOGY

## 2024-12-11 ASSESSMENT — ENCOUNTER SYMPTOMS
EYES NEGATIVE: 1
ABDOMINAL PAIN: 0
SHORTNESS OF BREATH: 0
ALLERGIC/IMMUNOLOGIC NEGATIVE: 1

## 2025-01-02 ENCOUNTER — HOSPITAL ENCOUNTER (OUTPATIENT)
Age: 55
Discharge: HOME | End: 2025-01-04
Attending: ANESTHESIOLOGY
Payer: COMMERCIAL

## 2025-01-02 DIAGNOSIS — M54.16 LUMBAR RADICULOPATHY: ICD-10-CM

## 2025-01-02 PROCEDURE — 72148 MRI LUMBAR SPINE W/O DYE: CPT

## 2025-01-13 NOTE — PROGRESS NOTES
Procedure Date: 2025   Location: [facility]        Procedure: L4/5 IL NORBERT       Time Out performed prior to start of the procedure:       Harry Rudd M.D.  performed the following reviews on Cecilia Fallon 1970 prior to the start of the procedure:       patient was identified by name and     agreement on procedure being performed was verified   risks and benefits explained to patient by the provider  procedure site verified as   patient was positioned for comfort   consent signed and verified for procedure       Time:  10:00 AM        Procedure performed by:   Harry Rudd M.D.       Patient assisted by:   WILBER ROMERO MA

## 2025-01-16 ENCOUNTER — OFFICE VISIT (OUTPATIENT)
Dept: ORTHOPEDIC SURGERY | Age: 55
End: 2025-01-16
Payer: COMMERCIAL

## 2025-01-16 DIAGNOSIS — M54.16 LUMBAR RADICULOPATHY: Primary | ICD-10-CM

## 2025-01-16 PROCEDURE — 62323 NJX INTERLAMINAR LMBR/SAC: CPT | Performed by: ANESTHESIOLOGY

## 2025-01-16 RX ORDER — BETAMETHASONE SODIUM PHOSPHATE AND BETAMETHASONE ACETATE 3; 3 MG/ML; MG/ML
30 INJECTION, SUSPENSION INTRA-ARTICULAR; INTRALESIONAL; INTRAMUSCULAR; SOFT TISSUE ONCE
Status: COMPLETED | OUTPATIENT
Start: 2025-01-16 | End: 2025-01-16

## 2025-01-16 RX ADMIN — BETAMETHASONE SODIUM PHOSPHATE AND BETAMETHASONE ACETATE 30 MG: 3; 3 INJECTION, SUSPENSION INTRA-ARTICULAR; INTRALESIONAL; INTRAMUSCULAR; SOFT TISSUE at 09:30

## 2025-01-17 NOTE — PROGRESS NOTES
NAME: Cecilia Fallon   ID:[id number]   :1970    Location: [facility]    Procedure: Lumbar Epidural Steroid Injection Under Fluoroscopic Imaging    Pre-op Diagnosis: 1. Lumbar Spinal Stenosis . 2. Lumbar Degenerative Disc Disease. 3. Lumbar Radicular Pain    Post-op Diagnosis: Same     Anesthesia: Local only     Complications: None      After confirming written and informed consent and discussing the risk, benefits and alternatives for the procedure, the patient had the correct site marked by the physician performing the procedure. The specific risks of bleeding and infection were discussed. The patient was taken to the fluoroscopy suite. A pulse oximeter was placed, and verbal and visual monitoring were maintained throughout the procedure.    The patient was then placed in the prone position. The skin overlying the lumbo-sacral spine was then prepped with chlorhexidine gluconate and a sterile drape was placed. Appropriate sterile attire was worn, including sterile gloves. A time out was then performed involving the physician, radiation technologist and the patient.    Fluoroscopy was then used to identify the anatomy of the lumbo-sacral spine. The skin overlying the L4/5 interspace was then anesthetized with 3 ml of 1% lidocaine using a 25G 1.5 inch needle. Next, an 20 G 9 cm Tuohy needle was then advanced through the skin, underlying subcutaneous fat and into the supraspinous ligament using intermittent fluoroscopy. After contacting ligament, a contralateral oblique fluoroscopic view of the interspace was obtained to determine the depth of the Tuohy needle. Proper medial-lateral location of the needle was obtained using anterior-posterior fluoroscopic views. A loss of resistance to air technique was then used to traverse the ligamentum flavum into the epidural space. 2 ml of Omnipaque-240 was then used to confirm the location of the needle tip in the epidural space, excluding vascular or intrathecal placement of

## 2025-01-31 ENCOUNTER — HOSPITAL ENCOUNTER (OUTPATIENT)
Dept: MAMMOGRAPHY | Age: 55
Discharge: HOME | End: 2025-02-03
Payer: COMMERCIAL

## 2025-01-31 DIAGNOSIS — Z12.31 ENCOUNTER FOR SCREENING MAMMOGRAM FOR MALIGNANT NEOPLASM OF BREAST: ICD-10-CM

## 2025-01-31 PROCEDURE — 77063 BREAST TOMOSYNTHESIS BI: CPT

## 2025-05-30 ENCOUNTER — OFFICE VISIT (OUTPATIENT)
Age: 55
End: 2025-05-30
Payer: COMMERCIAL

## 2025-05-30 DIAGNOSIS — M54.12 CERVICAL RADICULOPATHY: ICD-10-CM

## 2025-05-30 DIAGNOSIS — M54.16 LUMBAR RADICULOPATHY: Primary | ICD-10-CM

## 2025-05-30 PROCEDURE — 99213 OFFICE O/P EST LOW 20 MIN: CPT | Performed by: ANESTHESIOLOGY

## 2025-05-30 RX ORDER — ZOLPIDEM TARTRATE 12.5 MG/1
12.5 TABLET, FILM COATED, EXTENDED RELEASE ORAL
COMMUNITY
Start: 2025-03-10

## 2025-05-30 RX ORDER — MULTIVITAMIN
1 TABLET ORAL DAILY
COMMUNITY
Start: 2025-03-07

## 2025-05-30 RX ORDER — POTASSIUM CHLORIDE 600 MG/1
8 TABLET, EXTENDED RELEASE ORAL DAILY PRN
COMMUNITY
Start: 2025-03-10

## 2025-05-30 RX ORDER — CETIRIZINE HYDROCHLORIDE 10 MG/1
10 TABLET ORAL DAILY
COMMUNITY
Start: 2024-08-20

## 2025-05-30 ASSESSMENT — ENCOUNTER SYMPTOMS
ABDOMINAL PAIN: 0
EYES NEGATIVE: 1
ALLERGIC/IMMUNOLOGIC NEGATIVE: 1
SHORTNESS OF BREATH: 0

## 2025-05-30 NOTE — PROGRESS NOTES
Chronic Pain Consult Note      Plan:     A comprehensive pain management plan may consist of the following: Testing, Therapy, Medications, Interventions, Consults, and Follow up.    Bilateral knee osteoarthritis  -S/p bilateral steroid injection (knees) with 50% pain relief  -Patient is candidate for possible TKA pending lack of benefit from further conservative measures including physical therapy and bracing  -Per Dr. Fernandez not a candidate for arthroscopic surgery  -Continue diclofenac to 75 mg twice daily. Spent time discussing general side effects concerns with medication. Also spent time discussing avoidance of additional NSAIDs.  Cervical radiculopathy  -Status post repeated interlaminar C5-6 NORBERT with 75% or more pain relief for beyond 3 months  - Schedule repeat (left) C5/6 NORBERT  Cervical spondylosis without myelopathy  -Potential for medial branch blocks/RFA pending response to NORBERT  -Status post left cervical paraspinous and trapezius muscle trigger point injections with minimal relief  Fibromyalgia  -Patient currently receiving antiepileptic, tramadol, anti-inflammatories, antidepressant from outside physician  Lumbar radiculopathy  -Sched repeat L4-5 NORBERT with previous injections producing 80% reduction in pain for 3 to 4 months  Chronic pain syndrome  -Encourage continuation of active healthy lifestyle    General Recommendations: The pain condition that the patient suffers from is best treated with a multidisciplinary approach that involves an increase in physical activity to prevent de-conditioning and worsening of the pain cycle, as well as psychological counseling (formal and/or informal) to address the co morbid psychological effects of pain. Treatment will often involve judicious use of pain medications and interventional procedures to decrease the pain, allowing the patient to participate in the physical activity that will ultimately produce long-lasting pain reductions. The goal of the

## 2025-06-13 NOTE — PROGRESS NOTES
Location: [facility]       Procedure: CERVICAL NORBERT 5/6        Time Out performed prior to start of the procedure:       Harry Rudd MD performed the following reviews on Cecilia Fallon 1970 prior to the start of the procedure:       patient was identified by name and     agreement on procedure being performed was verified   risks and benefits explained to patient by the provider  procedure site verified as Bilateral  patient was positioned for comfort   consent signed and verified for procedure             Procedure performed by:   Harry Rudd MD      Patient assisted by:   KELLIE ROB

## 2025-06-18 ENCOUNTER — TELEPHONE (OUTPATIENT)
Age: 55
End: 2025-06-18

## 2025-06-18 ENCOUNTER — OFFICE VISIT (OUTPATIENT)
Age: 55
End: 2025-06-18

## 2025-06-18 DIAGNOSIS — M54.12 CERVICAL RADICULOPATHY: Primary | ICD-10-CM

## 2025-06-18 RX ORDER — DEXAMETHASONE SODIUM PHOSPHATE 10 MG/ML
10 INJECTION, SOLUTION INTRA-ARTICULAR; INTRALESIONAL; INTRAMUSCULAR; INTRAVENOUS; SOFT TISSUE ONCE
Status: COMPLETED | OUTPATIENT
Start: 2025-06-18 | End: 2025-06-18

## 2025-06-18 NOTE — TELEPHONE ENCOUNTER
Pt is requesting right side cervical TPI. She is having left ZITA today but states the right is bothering her as well.

## 2025-06-18 NOTE — PROGRESS NOTES
NAME: Cecilia Fallon     ID:[id number]     :1970    Location: 390    Procedure: Cerivcal Epidural Steroid Injection Under Fluoroscopic Imaging    Pre-op Diagnosis: 1. Cervical Degenerative Disc Disease. 2. Cervical Radicular Pain    Post-op Diagnosis: Same    Anesthesia: Local only     Complications: None    After confirming written and informed consent and discussing the risk, benefits and alternatives for the procedure, the patient had the correct site marked by the physician performing the procedure. The specific risks of bleeding and infection were discussed. The patient was taken to the fluoroscopy suite.    The patient was then placed in the prone position. A pulse oximeter was placed, and verbal and visual monitoring were maintained throughout the procedure. The skin overlying the cervical and thoracic spine was then prepped with chlorhexidine gluconate and a sterile drape was aced. Appropriate sterile attire was worn,  including sterile gloves. A time out was then performed involving the physician, radiation technologist and the patient.    Fluoroscopy was then used to identify the anatomy of the cervical and thoracic spine. The skin overlying the C5-6 interspace was then anesthetized with 3 ml of 1% lidocaine using a 25G 1.5 inch needle. Next, an 20 G 9 cm Tuohy needle was then advanced through the skin, underlying subcutaneous fat and into the supraspinous ligament using intermittent fluoroscopy. After contacting ligament, a contralateral oblique view of the interspace was obtained with fluoroscopy to determine the depth of the Tuohy needle. Proper medial-lateral location of the needle was obtained using anterior-posterior fluoroscopic views. A loss of resistance to air technique was then used to traverse the ligamentum flavum into the epidural space. 1ml of Omnipaque-240 was then used to confirm the location of the needle tip in the epidural space, excluding vascular or intrathecal placement of

## 2025-06-27 NOTE — PROGRESS NOTES
Location: [facility]       Procedure: L4/5 [place] NORBERT       Time Out performed prior to start of the procedure:       Harry Rudd MD performed the following reviews on Cecilia Fallon 1970 prior to the start of the procedure:       patient was identified by name and     agreement on procedure being performed was verified   risks and benefits explained to patient by the provider  procedure site verified as Bilateral  patient was positioned for comfort   consent signed and verified for procedure             Procedure performed by:   Harry Rudd MD      Patient assisted by:   PALOMO HESS

## 2025-07-01 ENCOUNTER — OFFICE VISIT (OUTPATIENT)
Age: 55
End: 2025-07-01

## 2025-07-01 DIAGNOSIS — M54.16 LUMBAR RADICULOPATHY: Primary | ICD-10-CM

## 2025-07-01 RX ORDER — DEXAMETHASONE SODIUM PHOSPHATE 10 MG/ML
10 INJECTION, SOLUTION INTRA-ARTICULAR; INTRALESIONAL; INTRAMUSCULAR; INTRAVENOUS; SOFT TISSUE ONCE
Status: COMPLETED | OUTPATIENT
Start: 2025-07-01 | End: 2025-07-01

## 2025-07-01 NOTE — PROGRESS NOTES
NAME: Cecilia Fallon   ID:[id number]   :1970    Location: 390    Procedure: Lumbar Epidural Steroid Injection Under Fluoroscopic Imaging    Pre-op Diagnosis: 1. Lumbar Spinal Stenosis . 2. Lumbar Degenerative Disc Disease. 3. Lumbar Radicular Pain    Post-op Diagnosis: Same     Anesthesia: Local only     Complications: None      After confirming written and informed consent and discussing the risk, benefits and alternatives for the procedure, the patient had the correct site marked by the physician performing the procedure. The specific risks of bleeding and infection were discussed. The patient was taken to the fluoroscopy suite. A pulse oximeter was placed, and verbal and visual monitoring were maintained throughout the procedure.    The patient was then placed in the prone position. The skin overlying the lumbo-sacral spine was then prepped with chlorhexidine gluconate and a sterile drape was placed. Appropriate sterile attire was worn, including sterile gloves. A time out was then performed involving the physician, radiation technologist and the patient.    Fluoroscopy was then used to identify the anatomy of the lumbo-sacral spine. The skin overlying the L4-5 interspace was then anesthetized with 3 ml of 1% lidocaine using a 25G 1.5 inch needle. Next, an 20 G 9 cm Tuohy needle was then advanced through the skin, underlying subcutaneous fat and into the supraspinous ligament using intermittent fluoroscopy. After contacting ligament, a contralateral oblique fluoroscopic view of the interspace was obtained to determine the depth of the Tuohy needle. Proper medial-lateral location of the needle was obtained using anterior-posterior fluoroscopic views. A loss of resistance to air technique was then used to traverse the ligamentum flavum into the epidural space. 1ml of Omnipaque-240 was then used to confirm the location of the needle tip in the epidural space, excluding vascular or intrathecal placement of

## 2025-07-23 ENCOUNTER — OFFICE VISIT (OUTPATIENT)
Age: 55
End: 2025-07-23
Payer: COMMERCIAL

## 2025-07-23 DIAGNOSIS — M54.16 LUMBAR RADICULOPATHY: Primary | ICD-10-CM

## 2025-07-23 DIAGNOSIS — M54.12 CERVICAL RADICULOPATHY: ICD-10-CM

## 2025-07-23 PROCEDURE — 99213 OFFICE O/P EST LOW 20 MIN: CPT | Performed by: ANESTHESIOLOGY

## 2025-07-23 RX ORDER — IBUPROFEN 600 MG/1
TABLET, FILM COATED ORAL
COMMUNITY
Start: 2025-07-15

## 2025-07-23 RX ORDER — RIMEGEPANT SULFATE 75 MG/75MG
TABLET, ORALLY DISINTEGRATING ORAL
COMMUNITY
Start: 2025-07-17

## 2025-07-23 ASSESSMENT — ENCOUNTER SYMPTOMS
EYES NEGATIVE: 1
SHORTNESS OF BREATH: 0
ALLERGIC/IMMUNOLOGIC NEGATIVE: 1
ABDOMINAL PAIN: 0

## 2025-07-23 NOTE — PROGRESS NOTES
Social History     Socioeconomic History    Marital status:      Spouse name: Not on file    Number of children: Not on file    Years of education: Not on file    Highest education level: Not on file   Occupational History    Not on file   Tobacco Use    Smoking status: Never    Smokeless tobacco: Never   Substance and Sexual Activity    Alcohol use: No    Drug use: No    Sexual activity: Not on file   Other Topics Concern    Not on file   Social History Narrative    Not on file     Social Drivers of Health     Financial Resource Strain: High Risk (3/12/2025)    Received from [facility]    Financial Resource Strain     Difficulty Paying Living Expenses: Not hard at all     Difficulty Paying Medical Expenses: Yes   Food Insecurity: No Food Insecurity (3/12/2025)    Received from [facility]    Food Insecurity     Worried about Running Out of Food in the Last Year: Never true     Ran Out of Food in the Last Year: Never true   Transportation Needs: No Transportation Needs (3/12/2025)    Received from [facility]    Transportation Needs     Lack of Transportation: No   Physical Activity: Insufficiently Active (2/7/2025)    Received from [facility]    Physical Activity     Days of Exercise per Week: 3 days     On average, how many minutes do you exercise per day?: 30     Total Minutes of Exercise Per Week: 90   Stress: No Stress Concern Present (3/12/2025)    Received from [facility]    Stress     Feeling of Stress : Only a little   Social Connections: Moderately Integrated (3/12/2025)    Received from [facility]    Social Connections     Frequency of Communication with Friends and Family: More than three times a week     Frequency of Social Gatherings with Friends and Family: Once a week   Intimate Partner Violence: Not At Risk (3/12/2025)    Received from [facility]    Intimate Partner Violence     Fear of Current or Ex-Partner: No     Emotionally Abused: No     Physically Abused: No

## 2025-08-25 ENCOUNTER — OFFICE VISIT (OUTPATIENT)
Age: 55
End: 2025-08-25
Payer: COMMERCIAL

## 2025-08-25 DIAGNOSIS — M48.02 CERVICAL STENOSIS OF SPINAL CANAL: Primary | ICD-10-CM

## 2025-08-25 DIAGNOSIS — M54.12 CERVICAL RADICULOPATHY: ICD-10-CM

## 2025-08-25 PROCEDURE — 62321 NJX INTERLAMINAR CRV/THRC: CPT | Performed by: ANESTHESIOLOGY

## 2025-08-25 RX ORDER — DEXAMETHASONE SODIUM PHOSPHATE 10 MG/ML
10 INJECTION, SOLUTION INTRA-ARTICULAR; INTRALESIONAL; INTRAMUSCULAR; INTRAVENOUS; SOFT TISSUE ONCE
Status: COMPLETED | OUTPATIENT
Start: 2025-08-25 | End: 2025-08-25

## (undated) DEVICE — UNIVERSAL DRAPES: Brand: MEDLINE INDUSTRIES, INC.

## (undated) DEVICE — MARKER UTIL W/RULER AND LBL -- STRL

## (undated) DEVICE — RESERVOIR,SUCTION,100CC,SILICONE: Brand: MEDLINE

## (undated) DEVICE — GARMENT,MEDLINE,DVT,INT,CALF,MED, GEN2: Brand: MEDLINE

## (undated) DEVICE — SUT CHRMC 4-0 27IN SH BRN --

## (undated) DEVICE — BANDAGE COBAN 4 IN COMPR W4INXL5YD FOAM COHESIVE QUIK STK SELF ADH SFT

## (undated) DEVICE — DRAPE TWL SURG 16X26IN BLU ORB04] ALLCARE INC]

## (undated) DEVICE — BANDAGE,GAUZE,BULKEE II,4.5"X4.1YD,STRL: Brand: MEDLINE

## (undated) DEVICE — NDL FLTR TIP 5 MIC 18GX1.5IN --

## (undated) DEVICE — SOLUTION LACTATED RINGERS INJECTION USP

## (undated) DEVICE — TUBING SUCT L9FT FOR AUTOFUSE INFLTR SYS

## (undated) DEVICE — 1200 GUARD II KIT W/5MM TUBE W/O VAC TUBE: Brand: GUARDIAN

## (undated) DEVICE — SKIN CLOS DERMABND PRINEO 60CM -- DERMABOUND PRINEO

## (undated) DEVICE — SYSTEM SKIN CLOSURE 42CM DERMABOND PRINEO

## (undated) DEVICE — TOTAL TRAY, DB, 100% SILI FOLEY, 16FR 10: Brand: MEDLINE

## (undated) DEVICE — SURGICAL PROCEDURE PACK BASIC ST FRANCIS

## (undated) DEVICE — PAD,ABDOMINAL,5"X9",ST,LF,25/BX: Brand: MEDLINE INDUSTRIES, INC.

## (undated) DEVICE — DRAIN SURG 15FR SIL RND CHN W/ TRCR FULL FLUT DBL WRP TRAD

## (undated) DEVICE — BNDG,ELSTC,MATRIX,STRL,4"X5YD,LF,HOOK&LP: Brand: MEDLINE

## (undated) DEVICE — BNDG,ELSTC,MATRIX,STRL,6"X5YD,LF,HOOK&LP: Brand: MEDLINE

## (undated) DEVICE — INTENDED FOR TISSUE SEPARATION, AND OTHER PROCEDURES THAT REQUIRE A SHARP SURGICAL BLADE TO PUNCTURE OR CUT.: Brand: BARD-PARKER ® STAINLESS STEEL BLADES

## (undated) DEVICE — SUTURE VCRL SZ 3-0 L27IN ABSRB UD L19MM PS-2 3/8 CIR PRIM J427H

## (undated) DEVICE — DRAPE IRRIG FLD WRM W44XL44IN W/ AORN STD PRTBL INTRATEMP

## (undated) DEVICE — TUBING ASPIR L8IN OD3/8IN CLR VYN DISP

## (undated) DEVICE — SUTURE VCRL SZ 2-0 L36IN ABSRB UD L36MM CT-1 1/2 CIR J945H

## (undated) DEVICE — SKIN MARKER,REGULAR TIP WITH RULER AND LABELS: Brand: DEVON

## (undated) DEVICE — SPONGE LAP 18X18IN STRL -- 5/PK

## (undated) DEVICE — SYSTEM SKIN CLSR 22CM DERMBND PRINEO

## (undated) DEVICE — REM POLYHESIVE ADULT PATIENT RETURN ELECTRODE: Brand: VALLEYLAB

## (undated) DEVICE — STOCKINETTE ORTH W9XL36IN COT 2 PLY HLLW FOR HANDLING LMB

## (undated) DEVICE — GLOVE ORANGE PI 8 1/2   MSG9085

## (undated) DEVICE — SUT CHRMC 4-0 18IN PS2 BRN --

## (undated) DEVICE — DRAPE SHT 3 QTR PROXIMA 53X77 --

## (undated) DEVICE — SUTURE STRATAFIX SPRL MCRYL + SZ 4-0 L12IN ABSRB UD PS-2 SXMP1B117

## (undated) DEVICE — Device

## (undated) DEVICE — PREP SKN CHLRAPRP APL 26ML STR --